# Patient Record
Sex: FEMALE | Race: WHITE | NOT HISPANIC OR LATINO | Employment: FULL TIME | URBAN - METROPOLITAN AREA
[De-identification: names, ages, dates, MRNs, and addresses within clinical notes are randomized per-mention and may not be internally consistent; named-entity substitution may affect disease eponyms.]

---

## 2018-01-02 ENCOUNTER — OFFICE VISIT (OUTPATIENT)
Dept: FAMILY MEDICINE | Facility: CLINIC | Age: 58
End: 2018-01-02
Payer: COMMERCIAL

## 2018-01-02 VITALS
OXYGEN SATURATION: 96 % | DIASTOLIC BLOOD PRESSURE: 70 MMHG | HEIGHT: 64 IN | BODY MASS INDEX: 23.6 KG/M2 | HEART RATE: 73 BPM | WEIGHT: 138.2 LBS | RESPIRATION RATE: 18 BRPM | TEMPERATURE: 98.7 F | SYSTOLIC BLOOD PRESSURE: 132 MMHG

## 2018-01-02 DIAGNOSIS — C91.10 CLL (CHRONIC LYMPHOCYTIC LEUKEMIA) (H): ICD-10-CM

## 2018-01-02 DIAGNOSIS — Z12.31 ENCOUNTER FOR SCREENING MAMMOGRAM FOR BREAST CANCER: ICD-10-CM

## 2018-01-02 DIAGNOSIS — J20.9 ACUTE BRONCHITIS, UNSPECIFIED ORGANISM: Primary | ICD-10-CM

## 2018-01-02 PROCEDURE — 99214 OFFICE O/P EST MOD 30 MIN: CPT | Performed by: PHYSICIAN ASSISTANT

## 2018-01-02 RX ORDER — BENZONATATE 200 MG/1
200 CAPSULE ORAL 3 TIMES DAILY PRN
Qty: 21 CAPSULE | Refills: 0 | Status: SHIPPED | OUTPATIENT
Start: 2018-01-02 | End: 2018-01-12

## 2018-01-02 RX ORDER — AZITHROMYCIN 250 MG/1
TABLET, FILM COATED ORAL
Qty: 6 TABLET | Refills: 0 | Status: SHIPPED | OUTPATIENT
Start: 2018-01-02 | End: 2018-01-12

## 2018-01-02 NOTE — NURSING NOTE
"Chief Complaint   Patient presents with     URI       Initial /70 (BP Location: Right arm, Patient Position: Chair, Cuff Size: Adult Regular)  Pulse 73  Temp 98.7  F (37.1  C) (Tympanic)  Resp 18  Ht 5' 3.5\" (1.613 m)  Wt 138 lb 3.2 oz (62.7 kg)  SpO2 96%  BMI 24.1 kg/m2 Estimated body mass index is 24.1 kg/(m^2) as calculated from the following:    Height as of this encounter: 5' 3.5\" (1.613 m).    Weight as of this encounter: 138 lb 3.2 oz (62.7 kg).  Medication Reconciliation: complete   Jacki Puente MA       "

## 2018-01-02 NOTE — PROGRESS NOTES
"  SUBJECTIVE:   Mckenna Rios is a 57 year old female who presents to clinic today for the following health issues:    RESPIRATORY SYMPTOMS      Duration: Since [place]     Description  nasal congestion and cough    Severity: moderate    Accompanying signs and symptoms: None    History (predisposing factors):  none    Precipitating or alleviating factors: None    Therapies tried and outcome:  Mucinex, Delsym cough syrup, somewhat effective.     -Patient presents to discuss upper respiratory illness since xmas ever  -She has continued to fight her symptoms but they are worsening/not improving  -symptoms first began with cough, sinus drainage  -she continues to have congestion/rhinorrhea but the cough is worse  -throat irritation  -taking delsym for sleep, moderately effective  -denies fevers, chills or body ache    Problem list and histories reviewed & adjusted, as indicated.  Additional history: as documented    Patient Active Problem List   Diagnosis     CLL (chronic lymphocytic leukemia) (H)     Past Surgical History:   Procedure Laterality Date     HYSTERECTOMY  2005       Social History   Substance Use Topics     Smoking status: Never Smoker     Smokeless tobacco: Never Used     Alcohol use No     Family History   Problem Relation Age of Onset     DIABETES Father      Other Cancer Father              Reviewed and updated as needed this visit by clinical staff     Reviewed and updated as needed this visit by Provider         ROS:  Constitutional, HEENT, cardiovascular, pulmonary, gi and gu systems are negative, except as otherwise noted.      OBJECTIVE:   /70 (BP Location: Right arm, Patient Position: Chair, Cuff Size: Adult Regular)  Pulse 73  Temp 98.7  F (37.1  C) (Tympanic)  Resp 18  Ht 5' 3.5\" (1.613 m)  Wt 138 lb 3.2 oz (62.7 kg)  SpO2 96%  BMI 24.1 kg/m2  Body mass index is 24.1 kg/(m^2).  GENERAL: healthy, alert and no distress  EYES: Eyes grossly normal to inspection, PERRL and " conjunctivae and sclerae normal  HENT: normal cephalic/atraumatic, ear canals and TM's normal, nasal mucosa edematous , rhinorrhea white, oropharynx raw and sinuses: not tender  NECK: no adenopathy  RESP: lungs grossly clear to auscultation - some scattered rhonchi clearing with cough  CV: regular rates and rhythm, normal S1 S2, no S3 or S4 and no murmur, click or rub    Diagnostic Test Results:  none     ASSESSMENT/PLAN:   1. Acute bronchitis, unspecified organism  Given hx and duration of symptoms will treat preventively, especially with hx of cll. Continue with supportive cares. Follow up if not improving  - azithromycin (ZITHROMAX) 250 MG tablet; Two tablets first day, then one tablet daily for four days.  Dispense: 6 tablet; Refill: 0  - benzonatate (TESSALON) 200 MG capsule; Take 1 capsule (200 mg) by mouth 3 times daily as needed for cough  Dispense: 21 capsule; Refill: 0    2. Encounter for screening mammogram for breast cancer  due  - *MA Screening Digital Bilateral; Future    3. CLL (chronic lymphocytic leukemia) (H)  Continues with Dr. Arroyo. Follow up next in 02/2018      Alexis Zheng PA-C  [facility]

## 2018-01-02 NOTE — MR AVS SNAPSHOT
After Visit Summary   1/2/2018    Mckenna Rios    MRN: [id number]           Patient Information     Date Of Birth          1960        Visit Information        Provider Department      1/2/2018 2:00 PM Alexis Zheng PA-C [facility]        Today's Diagnoses     Acute bronchitis, unspecified organism    -  1    Encounter for screening mammogram for breast cancer        CLL (chronic lymphocytic leukemia) (H)          Care Instructions    To schedule your mammogram at any of the [place] locations, call [phone].            Follow-ups after your visit        Your next 10 appointments already scheduled     Jan 12, 2018  4:40 PM CST   Pre-Op physical with Alexis Zheng PA-C   [facility]    [address] [address] [phone]              Future tests that were ordered for you today     Open Future Orders        Priority Expected Expires Ordered    *MA Screening Digital Bilateral Routine  1/2/2019 1/2/2018            Who to contact     If you have questions or need follow up information about today's clinic visit or your schedule please contact [facility] directly at [phone].  Normal or non-critical lab and imaging results will be communicated to you by MyChart, letter or phone within 4 business days after the clinic has received the results. If you do not hear from us within 7 days, please contact the clinic through MyChart or phone. If you have a critical or abnormal lab result, we will notify you by phone as soon as possible.  Submit refill requests through ipDatatel or call your pharmacy and they will forward the refill request to us. Please allow 3 business days for your refill to be completed.          Additional Information About Your Visit        MyChart Information     ipDatatel lets you send messages to your doctor, view your test results, renew your prescriptions,  "schedule appointments and more. To sign up, go to [email or web address] . Click on \"Log in\" on the left side of the screen, which will take you to the Welcome page. Then click on \"Sign up Now\" on the right side of the page.     You will be asked to enter the access code listed below, as well as some personal information. Please follow the directions to create your username and password.     Your access code is: [email or web address]  Expires: 2018  2:30 PM     Your access code will  in 90 days. If you need help or a new code, please call your [place] clinic or [phone].        Care EveryWhere ID     This is your Care EveryWhere ID. This could be used by other organizations to access your [place] medical records  [id number]        Your Vitals Were     Pulse Temperature Respirations Height Pulse Oximetry BMI (Body Mass Index)    73 98.7  F (37.1  C) (Tympanic) 18 5' 3.5\" (1.613 m) 96% 24.1 kg/m2       Blood Pressure from Last 3 Encounters:   18 132/70   16 116/78    Weight from Last 3 Encounters:   18 138 lb 3.2 oz (62.7 kg)   16 132 lb 4.8 oz (60 kg)                 Today's Medication Changes          These changes are accurate as of: 18  2:30 PM.  If you have any questions, ask your nurse or doctor.               Start taking these medicines.        Dose/Directions    azithromycin 250 MG tablet   Commonly known as:  ZITHROMAX   Used for:  Acute bronchitis, unspecified organism   Started by:  Alexis Zheng PA-C        Two tablets first day, then one tablet daily for four days.   Quantity:  6 tablet   Refills:  0       benzonatate 200 MG capsule   Commonly known as:  TESSALON   Used for:  Acute bronchitis, unspecified organism   Started by:  Alexis Zheng PA-C        Dose:  200 mg   Take 1 capsule (200 mg) by mouth 3 times daily as needed for cough   Quantity:  21 capsule   Refills:  0            Where to get your medicines      These medications were sent to " [facility]/pharmacy #[id number] - [place], [place] - [address] [address]  [address], [facility] [address]     Phone:  [phone]     azithromycin 250 MG tablet    benzonatate 200 MG capsule                Primary Care Provider Office Phone # Fax #    Alexis Zheng PA-C [phone] [address]  [facility] [address]        Equal Access to Services     DEEPTHI PEARL : Hadii aad ku hadasho Soomaali, waaxda luqadaha, qaybta kaalmada adeegyada, waxay idiin hayaan adeeg kharash la'aan ah. So [facility] [phone].    ATENCIÓN: Si habla español, tiene a kirby disposición servicios gratuitos de asistencia lingüística. Llame al [phone].    We comply with applicable federal civil rights laws and [place] laws. We do not discriminate on the basis of race, color, national origin, age, disability, sex, sexual orientation, or gender identity.            Thank you!     Thank you for choosing [facility]  for your care. Our goal is always to provide you with excellent care. Hearing back from our patients is one way we can continue to improve our services. Please take a few minutes to complete the written survey that you may receive in the mail after your visit with us. Thank you!             Your Updated Medication List - Protect others around you: Learn how to safely use, store and throw away your medicines at www.disposemymeds.org.          This list is accurate as of: 1/2/18  2:30 PM.  Always use your most recent med list.                   Brand Name Dispense Instructions for use Diagnosis    azithromycin 250 MG tablet    ZITHROMAX    6 tablet    Two tablets first day, then one tablet daily for four days.    Acute bronchitis, unspecified organism       benzonatate 200 MG capsule    TESSALON    21 capsule    Take 1 capsule (200 mg) by mouth 3 times daily as needed for cough    Acute bronchitis, unspecified organism

## 2018-01-08 ENCOUNTER — HOSPITAL ENCOUNTER (OUTPATIENT)
Facility: CLINIC | Age: 58
End: 2018-01-08
Attending: OPHTHALMOLOGY | Admitting: OPHTHALMOLOGY
Payer: COMMERCIAL

## 2018-01-12 ENCOUNTER — OFFICE VISIT (OUTPATIENT)
Dept: FAMILY MEDICINE | Facility: CLINIC | Age: 58
End: 2018-01-12
Payer: COMMERCIAL

## 2018-01-12 VITALS
SYSTOLIC BLOOD PRESSURE: 150 MMHG | OXYGEN SATURATION: 98 % | RESPIRATION RATE: 18 BRPM | BODY MASS INDEX: 23.49 KG/M2 | TEMPERATURE: 97.1 F | HEART RATE: 75 BPM | HEIGHT: 64 IN | WEIGHT: 137.6 LBS | DIASTOLIC BLOOD PRESSURE: 70 MMHG

## 2018-01-12 DIAGNOSIS — H26.9 CATARACT OF BOTH EYES, UNSPECIFIED CATARACT TYPE: ICD-10-CM

## 2018-01-12 DIAGNOSIS — Z01.818 PREOP GENERAL PHYSICAL EXAM: Primary | ICD-10-CM

## 2018-01-12 PROCEDURE — 99214 OFFICE O/P EST MOD 30 MIN: CPT | Performed by: PHYSICIAN ASSISTANT

## 2018-01-12 NOTE — PROGRESS NOTES
[facility]  [address] [address] [phone]  Dept: [phone]    PRE-OP EVALUATION:  Today's date: 2018    Mckenna Rios (: 1960) presents for pre-operative evaluation assessment as requested by Dr. Sonu Allen.  She requires evaluation and anesthesia risk assessment prior to undergoing surgery/procedure for treatment .  Proposed procedure: Cataract Surgery - Right Eye     Date of Surgery/ Procedure: 18 - Cataract Surgery - Right Eye   Time of Surgery/ Procedure: 9:00am  Hospital/Surgical Facility: [facility]  Fax number for surgical facility: [phone]  Primary Physician: Alexis Zheng  Type of Anesthesia Anticipated: to be determined    Patient has a Health Care Directive or Living Will:  NO    Preop Questions 2018   1.  Do you have a history of heart attack, stroke, stent, bypass or surgery on an artery in the head, neck, heart or legs? No   2.  Do you ever have any pain or discomfort in your chest? No   3.  Do you have a history of  Heart Failure? No   4.   Are you troubled by shortness of breath when:  walking on a level surface, or up a slight hill, or at night? No   5.  Do you currently have a cold, bronchitis or other respiratory infection? No   6.  Do you have a cough, shortness of breath, or wheezing? YES - mild cough, episodic   7.  Do you sometimes get pains in the calves of your legs when you walk? No   8. Do you or anyone in your family have previous history of blood clots? YES - both her mother and daughter   9.  Do you or does anyone in your family have a serious bleeding problem such as prolonged bleeding following surgeries or cuts? No   10. Have you ever had problems with anemia or been told to take iron pills? No   11. Have you had any abnormal blood loss such as black, tarry or bloody stools, or abnormal vaginal bleeding? No   12. Have you ever had a blood transfusion? No   13. Have you or any  "of your relatives ever had problems with anesthesia? No   14. Do you have sleep apnea, excessive snoring or daytime drowsiness? No   15. Do you have any prosthetic heart valves? No   16. Do you have prosthetic joints? No   17. Is there any chance that you may be pregnant? No           HPI:                                                      Brief HPI related to upcoming procedure: Patient with a 3 year hx lens clouding, vision changes and retinal changes. She was monitoring the eyes for a time, but the right eye is now \"ready for surgery\"      See problem list for active medical problems.  Problems all longstanding and stable, except as noted/documented.  See ROS for pertinent symptoms related to these conditions.                                                                                                  .    MEDICAL HISTORY:                                                    Patient Active Problem List    Diagnosis Date Noted     CLL (chronic lymphocytic leukemia) (H) 11/29/2016     Priority: Medium     See Dr. Arroyo at [facility]. Dx 2011. Labs q 6 months - will be updating this 02/2018, 07/2018        No past medical history on file.  Past Surgical History:   Procedure Laterality Date     HYSTERECTOMY  2005     Current Outpatient Prescriptions   Medication Sig Dispense Refill     azithromycin (ZITHROMAX) 250 MG tablet Two tablets first day, then one tablet daily for four days. 6 tablet 0     benzonatate (TESSALON) 200 MG capsule Take 1 capsule (200 mg) by mouth 3 times daily as needed for cough 21 capsule 0     OTC products: None, except as noted above    No Known Allergies   Latex Allergy: NO    Social History   Substance Use Topics     Smoking status: Never Smoker     Smokeless tobacco: Never Used     Alcohol use No     History   Drug Use No       REVIEW OF SYSTEMS:                                                    C: NEGATIVE for fever, chills, change in weight  EYES: POSITIVE for vision changes, " "b/l  E/M: NEGATIVE for ear, mouth and throat problems  RESP:POSITIVE for mild cough  CV: NEGATIVE for chest pain, palpitations or peripheral edema  ROS otherwise negative    EXAM:                                                    /70  Pulse 75  Temp 97.1  F (36.2  C) (Tympanic)  Resp 18  Ht 5' 3.5\" (1.613 m)  Wt 137 lb 9.6 oz (62.4 kg)  SpO2 98%  BMI 23.99 kg/m2  GENERAL APPEARANCE: healthy, alert and no distress  HENT: ear canals and TM's normal and nose and mouth without ulcers or lesions  RESP: lungs clear to auscultation - no rales, rhonchi or wheezes  CV: regular rates and rhythm, normal S1 S2, no S3 or S4 and no murmur, click or rub  MS: no peripheral edema  NEURO: Normal strength and tone, sensory exam grossly normal, mentation intact and speech normal    DIAGNOSTICS:                                                    No labs or EKG required for low risk surgery (cataract, skin procedure, breast biopsy, etc)    Recent Labs   Lab Test 01/28/16   POTASSIUM  4.2   CR  0.60        IMPRESSION:                                                    Reason for surgery/procedure: cataract repair  Diagnosis/reason for consult: pre-op exam    The proposed surgical procedure is considered LOW risk.    REVISED CARDIAC RISK INDEX  The patient has the following serious cardiovascular risks for perioperative complications such as (MI, PE, VFib and 3  AV Block):  No serious cardiac risks  INTERPRETATION: 0 risks: Class I (very low risk - 0.4% complication rate)    The patient has the following additional risks for perioperative complications:  No identified additional risks      ICD-10-CM    1. Preop general physical exam Z01.818    2. Cataract of both eyes, unspecified cataract type H26.9        RECOMMENDATIONS:                                                      Mckenna's BP was slightly elevated today. We'll need to watch this down the road but this shouldn't preclude her from her procedure. There is certainly no " history of elevation.     APPROVAL GIVEN to proceed with proposed procedure, without further diagnostic evaluation       Signed Electronically by: Alexis Zheng PA-C    Copy of this evaluation report is provided to requesting physician.    [place] Preop Guidelines

## 2018-01-12 NOTE — NURSING NOTE
"Chief Complaint   Patient presents with     Pre-Op Exam       Initial /62 (BP Location: Right arm, Patient Position: Chair, Cuff Size: Adult Regular)  Pulse 75  Temp 97.1  F (36.2  C) (Tympanic)  Resp 18  Ht 5' 3.5\" (1.613 m)  Wt 137 lb 9.6 oz (62.4 kg)  SpO2 98%  BMI 23.99 kg/m2 Estimated body mass index is 23.99 kg/(m^2) as calculated from the following:    Height as of this encounter: 5' 3.5\" (1.613 m).    Weight as of this encounter: 137 lb 9.6 oz (62.4 kg).  Medication Reconciliation: complete   Jacki Puente MA       "

## 2018-01-12 NOTE — MR AVS SNAPSHOT
After Visit Summary   1/12/2018    Mckenna Rios    MRN: [id number]           Patient Information     Date Of Birth          1960        Visit Information        Provider Department      1/12/2018 4:40 PM Alexis Zheng PA-C [facility]        Today's Diagnoses     Preop general physical exam    -  1    Cataract of both eyes, unspecified cataract type          Care Instructions      Before Your Surgery      Call your surgeon if there is any change in your health. This includes signs of a cold or flu (such as a sore throat, runny nose, cough, rash or fever).    Do not smoke, drink alcohol or take over the counter medicine (unless your surgeon or primary care doctor tells you to) for the 24 hours before and after surgery.    If you take prescribed drugs: Follow your doctor s orders about which medicines to take and which to stop until after surgery.    Eating and drinking prior to surgery: follow the instructions from your surgeon    Take a shower or bath the night before surgery. Use the soap your surgeon gave you to gently clean your skin. If you do not have soap from your surgeon, use your regular soap. Do not shave or scrub the surgery site.  Wear clean pajamas and have clean sheets on your bed.           Follow-ups after your visit        Your next 10 appointments already scheduled     Feb 13, 2018   Procedure with Sonu Allen MD   [facility] (--)    [address]  [facility] [id number] [phone]            Feb 13, 2018   Procedure with Sonu Allen MD   [facility] (--)    [address]  [facility] [address]   [phone]              Who to contact     If you have questions or need follow up information about today's clinic visit or your schedule please contact [facility] directly at [phone].  Normal or non-critical lab and imaging results will be  "communicated to you by [employer], letter or phone within 4 business days after the clinic has received the results. If you do not hear from us within 7 days, please contact the clinic through [employer] or phone. If you have a critical or abnormal lab result, we will notify you by phone as soon as possible.  Submit refill requests through [employer] or call your pharmacy and they will forward the refill request to us. Please allow 3 business days for your refill to be completed.          Additional Information About Your Visit        Carwow Information     Car[employer]ow lets you send messages to your doctor, view your test results, renew your prescriptions, schedule appointments and more. To sign up, go to [email or web address] . Click on \"Log in\" on the left side of the screen, which will take you to the Welcome page. Then click on \"Sign up Now\" on the right side of the page.     You will be asked to enter the access code listed below, as well as some personal information. Please follow the directions to create your username and password.     Your access code is: [email or web address]  Expires: 2018  2:30 PM     Your access code will  in 90 days. If you need help or a new code, please call your [place] clinic or [phone].        Care EveryWhere ID     This is your Care EveryWhere ID. This could be used by other organizations to access your [place] medical records  [id number]        Your Vitals Were     Pulse Temperature Respirations Height Pulse Oximetry BMI (Body Mass Index)    75 97.1  F (36.2  C) (Tympanic) 18 5' 3.5\" (1.613 m) 98% 23.99 kg/m2       Blood Pressure from Last 3 Encounters:   18 160/62   18 132/70   16 116/78    Weight from Last 3 Encounters:   18 137 lb 9.6 oz (62.4 kg)   18 138 lb 3.2 oz (62.7 kg)   16 132 lb 4.8 oz (60 kg)              Today, you had the following     No orders found for display       Primary Care Provider Office Phone # Fax #    Alexis Mcnair " RUPERT Zheng [phone]       [address] [facility] [address]        Equal Access to Services     ISHA PEARL : Hadii aad ku hadkeishashannon Solisa, wacecilleda katinaaleha, kathrinta kaelyseda jesús, fadumo rockwelldiane garcia. So [facility] [phone].    ATENCIÓN: Si habla español, tiene a kirby disposición servicios gratuitos de asistencia lingüística. Llame al [phone].    We comply with applicable federal civil rights laws and [place] laws. We do not discriminate on the basis of race, color, national origin, age, disability, sex, sexual orientation, or gender identity.            Thank you!     Thank you for choosing [facility]  for your care. Our goal is always to provide you with excellent care. Hearing back from our patients is one way we can continue to improve our services. Please take a few minutes to complete the written survey that you may receive in the mail after your visit with us. Thank you!             Your Updated Medication List - Protect others around you: Learn how to safely use, store and throw away your medicines at www.disposemymeds.org.      Notice  As of 1/12/2018  5:00 PM    You have not been prescribed any medications.

## 2018-01-24 ENCOUNTER — TELEPHONE (OUTPATIENT)
Dept: FAMILY MEDICINE | Facility: CLINIC | Age: 58
End: 2018-01-24

## 2018-01-24 NOTE — LETTER
[facility]  [address] [address]  Phone: [phone]    February 1, 2018        Mckenna Rios  [address]  [facility] [address]          To whom it may concern:    RE: Mckenna Mcintoshrenetta    I saw this patient for a pre-op 1/12/18.  She has further surgeries upcoming 2/13/18 at [place] with Dr. Sonu Allen.  She is approved for surgery on this date.     Please contact me for questions or concerns.      Sincerely,        Alexis Zheng PA-C

## 2018-01-24 NOTE — TELEPHONE ENCOUNTER
Patient is calling to ask for an addendum to her pre-op.  Her next cataract surgery is going to be on 2/13 at a different location.  The fax number to send it to is [phone]. (ZUNILDA kelly)  (she had talked about this with you at her last appointment)    Please call her if any questions. She will call back in a couple of days to  Be sure this was done.     Alexa Bailey, RN  Triage Nurse

## 2018-01-25 NOTE — TELEPHONE ENCOUNTER
Patient called back, and she will contact the surgery center and ask if this  Would be needed, she will let us know.   Alexa Bailey, ANICETO  Triage Nurse

## 2018-01-25 NOTE — TELEPHONE ENCOUNTER
Please have her call the surgery center and see if they think this is truly necessary. If will be just one month and a day from her pre-op and because done at a  hospital we wont need to fax. If so, I will try to addend this one.

## 2018-02-01 NOTE — TELEPHONE ENCOUNTER
Patient calling back stating that they need provider to clear patient for surgery. They need us to fax over a clearance to [phone] ATTN: Annette. Needs date of surgery, which is 2/13/18,  day provider is signing off, and that patient is cleared for surgery.   Jacki Puente MA

## 2018-02-09 RX ORDER — PHENYLEPHRINE HYDROCHLORIDE 25 MG/ML
1 SOLUTION/ DROPS OPHTHALMIC
Status: CANCELLED | OUTPATIENT
Start: 2018-02-09

## 2018-02-09 RX ORDER — MOXIFLOXACIN 5 MG/ML
1 SOLUTION/ DROPS OPHTHALMIC
Status: CANCELLED | OUTPATIENT
Start: 2018-02-09

## 2018-02-09 RX ORDER — DICLOFENAC SODIUM 1 MG/ML
1 SOLUTION/ DROPS OPHTHALMIC
Status: CANCELLED | OUTPATIENT
Start: 2018-02-09

## 2018-02-09 RX ORDER — TROPICAMIDE 10 MG/ML
1 SOLUTION/ DROPS OPHTHALMIC
Status: CANCELLED | OUTPATIENT
Start: 2018-02-09

## 2018-02-09 RX ORDER — PROPARACAINE HYDROCHLORIDE 5 MG/ML
1 SOLUTION/ DROPS OPHTHALMIC ONCE
Status: CANCELLED | OUTPATIENT
Start: 2018-02-09 | End: 2018-02-09

## 2018-02-13 ENCOUNTER — HOSPITAL ENCOUNTER (OUTPATIENT)
Facility: CLINIC | Age: 58
Discharge: HOME | End: 2018-02-13
Attending: OPHTHALMOLOGY | Admitting: OPHTHALMOLOGY
Payer: COMMERCIAL

## 2018-02-13 ENCOUNTER — TRANSFERRED RECORDS (OUTPATIENT)
Dept: HEALTH INFORMATION MANAGEMENT | Facility: CLINIC | Age: 58
End: 2018-02-13

## 2018-02-13 ENCOUNTER — SURGERY (OUTPATIENT)
Age: 58
End: 2018-02-13

## 2018-02-13 ENCOUNTER — ANESTHESIA (OUTPATIENT)
Dept: SURGERY | Facility: CLINIC | Age: 58
End: 2018-02-13
Payer: COMMERCIAL

## 2018-02-13 ENCOUNTER — ANESTHESIA EVENT (OUTPATIENT)
Dept: SURGERY | Facility: CLINIC | Age: 58
End: 2018-02-13
Payer: COMMERCIAL

## 2018-02-13 VITALS
DIASTOLIC BLOOD PRESSURE: 61 MMHG | HEIGHT: 64 IN | OXYGEN SATURATION: 97 % | BODY MASS INDEX: 23.49 KG/M2 | TEMPERATURE: 98 F | SYSTOLIC BLOOD PRESSURE: 118 MMHG | HEART RATE: 64 BPM | WEIGHT: 137.57 LBS | RESPIRATION RATE: 16 BRPM

## 2018-02-13 PROCEDURE — 25000128 H RX IP 250 OP 636: Performed by: ANESTHESIOLOGY

## 2018-02-13 PROCEDURE — 36000101 ZZH EYE SURGERY LEVEL 3 1ST 30 MIN: Performed by: OPHTHALMOLOGY

## 2018-02-13 PROCEDURE — 25000125 ZZHC RX 250: Performed by: ANESTHESIOLOGY

## 2018-02-13 PROCEDURE — V2632 POST CHMBR INTRAOCULAR LENS: HCPCS | Performed by: OPHTHALMOLOGY

## 2018-02-13 PROCEDURE — 71000028 ZZH EYE RECOVERY PHASE 2 EACH 15 MINS: Performed by: OPHTHALMOLOGY

## 2018-02-13 PROCEDURE — 25000125 ZZHC RX 250: Performed by: OPHTHALMOLOGY

## 2018-02-13 PROCEDURE — V2788 PRESBYOPIA-CORRECT FUNCTION: HCPCS | Performed by: OPHTHALMOLOGY

## 2018-02-13 PROCEDURE — 37000008 ZZH ANESTHESIA TECHNICAL FEE, 1ST 30 MIN: Performed by: OPHTHALMOLOGY

## 2018-02-13 PROCEDURE — 25000128 H RX IP 250 OP 636: Performed by: NURSE ANESTHETIST, CERTIFIED REGISTERED

## 2018-02-13 PROCEDURE — 36000135 ZZH KERATOTOMY ARCUATE W FEMTOSECOND LASER/IMAGING FOR ATIOL: Performed by: OPHTHALMOLOGY

## 2018-02-13 PROCEDURE — 25000128 H RX IP 250 OP 636: Performed by: OPHTHALMOLOGY

## 2018-02-13 PROCEDURE — 40000170 ZZH STATISTIC PRE-PROCEDURE ASSESSMENT II: Performed by: OPHTHALMOLOGY

## 2018-02-13 PROCEDURE — 27210794 ZZH OR GENERAL SUPPLY STERILE: Performed by: OPHTHALMOLOGY

## 2018-02-13 DEVICE — IMPLANTABLE DEVICE: Type: IMPLANTABLE DEVICE | Site: EYE | Status: FUNCTIONAL

## 2018-02-13 RX ORDER — SODIUM CHLORIDE, SODIUM LACTATE, POTASSIUM CHLORIDE, CALCIUM CHLORIDE 600; 310; 30; 20 MG/100ML; MG/100ML; MG/100ML; MG/100ML
INJECTION, SOLUTION INTRAVENOUS CONTINUOUS
Status: DISCONTINUED | OUTPATIENT
Start: 2018-02-13 | End: 2018-02-13 | Stop reason: HOSPADM

## 2018-02-13 RX ORDER — PROPARACAINE HYDROCHLORIDE 5 MG/ML
1 SOLUTION/ DROPS OPHTHALMIC ONCE
Status: DISCONTINUED | OUTPATIENT
Start: 2018-02-13 | End: 2018-02-13 | Stop reason: HOSPADM

## 2018-02-13 RX ORDER — PROPARACAINE HYDROCHLORIDE 5 MG/ML
SOLUTION/ DROPS OPHTHALMIC PRN
Status: DISCONTINUED | OUTPATIENT
Start: 2018-02-13 | End: 2018-02-13 | Stop reason: HOSPADM

## 2018-02-13 RX ORDER — BALANCED SALT SOLUTION 6.4; .75; .48; .3; 3.9; 1.7 MG/ML; MG/ML; MG/ML; MG/ML; MG/ML; MG/ML
SOLUTION OPHTHALMIC PRN
Status: DISCONTINUED | OUTPATIENT
Start: 2018-02-13 | End: 2018-02-13 | Stop reason: HOSPADM

## 2018-02-13 RX ORDER — ACETAMINOPHEN 160 MG
TABLET,DISINTEGRATING ORAL DAILY
COMMUNITY
End: 2018-09-12 | Stop reason: ALTCHOICE

## 2018-02-13 RX ORDER — TROPICAMIDE 10 MG/ML
1 SOLUTION/ DROPS OPHTHALMIC
Status: DISCONTINUED | OUTPATIENT
Start: 2018-02-13 | End: 2018-02-13 | Stop reason: HOSPADM

## 2018-02-13 RX ORDER — PHENYLEPHRINE HYDROCHLORIDE 25 MG/ML
1 SOLUTION/ DROPS OPHTHALMIC
Status: DISCONTINUED | OUTPATIENT
Start: 2018-02-13 | End: 2018-02-13 | Stop reason: HOSPADM

## 2018-02-13 RX ORDER — PROPARACAINE HYDROCHLORIDE 5 MG/ML
1 SOLUTION/ DROPS OPHTHALMIC ONCE
Status: COMPLETED | OUTPATIENT
Start: 2018-02-13 | End: 2018-02-13

## 2018-02-13 RX ORDER — MOXIFLOXACIN 5 MG/ML
1 SOLUTION/ DROPS OPHTHALMIC
Status: DISCONTINUED | OUTPATIENT
Start: 2018-02-13 | End: 2018-02-13 | Stop reason: HOSPADM

## 2018-02-13 RX ORDER — DICLOFENAC SODIUM 1 MG/ML
1 SOLUTION/ DROPS OPHTHALMIC
Status: DISCONTINUED | OUTPATIENT
Start: 2018-02-13 | End: 2018-02-13 | Stop reason: HOSPADM

## 2018-02-13 RX ORDER — LIDOCAINE HYDROCHLORIDE 10 MG/ML
INJECTION, SOLUTION EPIDURAL; INFILTRATION; INTRACAUDAL; PERINEURAL PRN
Status: DISCONTINUED | OUTPATIENT
Start: 2018-02-13 | End: 2018-02-13 | Stop reason: HOSPADM

## 2018-02-13 RX ORDER — ONDANSETRON 2 MG/ML
INJECTION INTRAMUSCULAR; INTRAVENOUS PRN
Status: DISCONTINUED | OUTPATIENT
Start: 2018-02-13 | End: 2018-02-13

## 2018-02-13 RX ADMIN — DICLOFENAC SODIUM 1 DROP: 1 SOLUTION/ DROPS OPHTHALMIC at 11:49

## 2018-02-13 RX ADMIN — Medication 1 APPLICATOR: at 13:12

## 2018-02-13 RX ADMIN — SODIUM CHLORIDE, POTASSIUM CHLORIDE, SODIUM LACTATE AND CALCIUM CHLORIDE: 600; 310; 30; 20 INJECTION, SOLUTION INTRAVENOUS at 12:43

## 2018-02-13 RX ADMIN — MOXIFLOXACIN 1 DROP: 5 SOLUTION/ DROPS OPHTHALMIC at 12:02

## 2018-02-13 RX ADMIN — DICLOFENAC SODIUM 1 DROP: 1 SOLUTION/ DROPS OPHTHALMIC at 12:03

## 2018-02-13 RX ADMIN — PROPARACAINE HYDROCHLORIDE 1 DROP: 5 SOLUTION/ DROPS OPHTHALMIC at 12:27

## 2018-02-13 RX ADMIN — PROPARACAINE HYDROCHLORIDE 1 DROP: 5 SOLUTION/ DROPS OPHTHALMIC at 12:32

## 2018-02-13 RX ADMIN — LIDOCAINE HYDROCHLORIDE 0.5 ML: 35 GEL OPHTHALMIC at 13:05

## 2018-02-13 RX ADMIN — ONDANSETRON 4 MG: 2 INJECTION INTRAMUSCULAR; INTRAVENOUS at 13:02

## 2018-02-13 RX ADMIN — TROPICAMIDE 1 DROP: 10 SOLUTION/ DROPS OPHTHALMIC at 12:02

## 2018-02-13 RX ADMIN — LIDOCAINE HYDROCHLORIDE 1 ML: 10 INJECTION, SOLUTION EPIDURAL; INFILTRATION; INTRACAUDAL; PERINEURAL at 13:11

## 2018-02-13 RX ADMIN — PROPARACAINE HYDROCHLORIDE 1 DROP: 5 SOLUTION/ DROPS OPHTHALMIC at 11:49

## 2018-02-13 RX ADMIN — TROPICAMIDE 1 DROP: 10 SOLUTION/ DROPS OPHTHALMIC at 11:49

## 2018-02-13 RX ADMIN — BALANCED SALT SOLUTION 15 ML: 6.4; .75; .48; .3; 3.9; 1.7 SOLUTION OPHTHALMIC at 13:05

## 2018-02-13 RX ADMIN — MOXIFLOXACIN 1 DROP: 5 SOLUTION/ DROPS OPHTHALMIC at 11:49

## 2018-02-13 RX ADMIN — LIDOCAINE HYDROCHLORIDE 0.5 ML: 10 INJECTION, SOLUTION EPIDURAL; INFILTRATION; INTRACAUDAL; PERINEURAL at 12:04

## 2018-02-13 RX ADMIN — BALANCED SALT SOLUTION 15 ML: 6.4; .75; .48; .3; 3.9; 1.7 SOLUTION OPHTHALMIC at 12:50

## 2018-02-13 RX ADMIN — MIDAZOLAM 2 MG: 1 INJECTION INTRAMUSCULAR; INTRAVENOUS at 12:58

## 2018-02-13 RX ADMIN — PHENYLEPHRINE HYDROCHLORIDE 1 DROP: 2.5 SOLUTION/ DROPS OPHTHALMIC at 12:02

## 2018-02-13 RX ADMIN — EPINEPHRINE 500 ML: 1 INJECTION, SOLUTION, CONCENTRATE INTRAVENOUS at 13:06

## 2018-02-13 RX ADMIN — PHENYLEPHRINE HYDROCHLORIDE 1 DROP: 2.5 SOLUTION/ DROPS OPHTHALMIC at 11:49

## 2018-02-13 NOTE — IP AVS SNAPSHOT
MRN:[id number]                      After Visit Summary   2/13/2018    Mckenna Rios    MRN: [id number]           Thank you!     Thank you for choosing [facility] for your care. Our goal is always to provide you with excellent care. Hearing back from our patients is one way we can continue to improve our services. Please take a few minutes to complete the written survey that you may receive in the mail after you visit with us. Thank you!        Patient Information     Date Of Birth          1960        About your hospital stay     You were admitted on:  February 13, 2018 You last received care in the:  [facility]    You were discharged on:  February 13, 2018       Who to Call     For medical emergencies, please call 911.  For non-urgent questions about your medical care, please call your primary care provider or clinic, [phone]  For questions related to your surgery, please call your surgery clinic        Attending Provider     Provider Specialty    Sonu Allen MD Ophthalmology       Primary Care Provider Office Phone # Fax #    Alexis Zheng PA-C [phone]      Further instructions from your care team       [facility] Discharge  Instructions  Anesthesia ([facility])   Adult Discharge Instructions    For 24 hours after surgery    1. Get plenty of rest.  Make arrangements to have a responsible adult stay with you for at least 6 hours after you leave the hospital.  2. Do not drive or use heavy equipment for 24 hours.    3. Do not drink alcohol for 24 hours.  4. Do not sign legal documents or make important decisions for 24 hours.  5. Avoid strenuous or risky activities. You may feel lightheaded.  If so, sit for a few minutes before standing.  Have someone help you get up.   6. Conscious sedation patients may resume a regular diet..  7. Any questions of medical nature, call your  "physician.    [facility]  Cataract Surgery Discharge Instructions  [facility] MD MARIANO Grier MD J. Hasan, MD C. Nichols, MD J. O'Neill, MD S. Schaefer, MD J. Stephens, MD        You may have been prescribed SmartDrops or OneDrop, which is a compound formula drop that combines all three medications in a single drop. This drop should be instilled to the surgical eye 3 times daily until gone.        Place shield over surgical eye at bedtime for 3 nights.      The eye will feel itchy, scratchy, and vision will be blurred, you may take Tylenol for the scratchy feeling if this is bothersome.      No eye rubbing or swimming for I week.      You may resume all prescription medications as directed by your primary doctor.      Call if increasing pain, progressively worsening vision or worsening redness of surgical eye.      On-call doctor can be reached at [phone].    Pending Results     No orders found from 2/11/2018 to 2/14/2018.            Admission Information     Date & Time Provider Department Dept. Phone    2/13/2018 Sonu Allen MD [facility] [place] [phone]      Your Vitals Were     Blood Pressure Pulse Temperature Respirations Height Weight    118/61 64 98  F (36.7  C) (Temporal) 16 1.613 m (5' 3.5\") 62.4 kg (137 lb 9.1 oz)    Pulse Oximetry BMI (Body Mass Index)                97% 23.99 kg/m2          [email or web address] Information     Envoy lets you send messages to your doctor, view your test results, renew your prescriptions, schedule appointments and more. To sign up, go to [email or web address] . Click on \"Log in\" on the left side of the screen, which will take you to the Welcome page. Then click on \"Sign up Now\" on the right side of the page.     You will be asked to enter the access code listed below, as well as some personal information. Please follow the directions to create your " username and password.     Your access code is: [email or web address]  Expires: 2018  2:30 PM     Your access code will  in 90 days. If you need help or a new code, please call your [place] clinic or [phone].        Care EveryWhere ID     This is your Care EveryWhere ID. This could be used by other organizations to access your [place] medical records  [id number]        Equal Access to Services     DEEPTHI [facility] : Hadii edgar ku hadasho Soomaali, waaxda luqadaha, qaybta kaalmada adeegyada, waxay millerin haychristophen lynnette ulloa . So [facility] [phone].    ATENCIÓN: Si habla español, tiene a kirby disposición servicios gratuitos de asistencia lingüística. Llame al [phone].    We comply with applicable federal civil rights laws and [place] laws. We do not discriminate on the basis of race, color, national origin, age, disability, sex, sexual orientation, or gender identity.               Review of your medicines      CONTINUE these medicines which have NOT CHANGED        Dose / Directions    FISH OIL CONCENTRATE PO        Take by mouth daily   Refills:  0       Gummi Bear Multivitamin  /Min Chew        Take by mouth daily   Refills:  0                Protect others around you: Learn how to safely use, store and throw away your medicines at www.disposemymeds.org.             Medication List: This is a list of all your medications and when to take them. Check marks below indicate your daily home schedule. Keep this list as a reference.      Medications           Morning Afternoon Evening Bedtime As Needed    FISH OIL CONCENTRATE PO   Take by mouth daily                                Gummi Bear Multivitamin  /Min Chew   Take by mouth daily

## 2018-02-13 NOTE — ANESTHESIA POSTPROCEDURE EVALUATION
Patient: Mckenna Rios    Procedure(s):  LEFT EYE FEMTOSECOND LASER ASSISTED PHACOEMULSIFICATION CLEAR CORNEA WITH DELUXE MULTIFOCAL INTRAOCULAR LENS IMPLANT - Wound Class: I-Clean    Diagnosis:CATARACT  Diagnosis Additional Information: No value filed.    Anesthesia Type:  MAC    Note:  Anesthesia Post Evaluation    Patient location during evaluation: PACU  Patient participation: Able to fully participate in evaluation  Level of consciousness: awake  Pain management: adequate  Airway patency: patent  Cardiovascular status: acceptable  Respiratory status: acceptable  Hydration status: acceptable  PONV: none     Anesthetic complications: None          Last vitals:  Vitals:    02/13/18 1205 02/13/18 1319 02/13/18 1329   BP:  113/64 118/61   Pulse: 64     Resp: 18 16 16   Temp: 36.7  C (98  F)     SpO2: 98% 95% 97%         Electronically Signed By: Palmer Bello MD  February 13, 2018  2:09 PM

## 2018-02-13 NOTE — ANESTHESIA PREPROCEDURE EVALUATION
"Procedure: Procedure(s):  PHACOEMULSIFICATION CLEAR CORNEA WITH DELUXE INTRAOCULAR LENS IMPLANT  Preop diagnosis: CATARACT  No Known Allergies  Patient Active Problem List   Diagnosis     CLL (chronic lymphocytic leukemia) (H)     Past Medical History:   Diagnosis Date     CLL (chronic lymphocytic leukemia) (H)      Past Surgical History:   Procedure Laterality Date     GYN SURGERY       HYSTERECTOMY  2005       No current facility-administered medications on file prior to encounter.   No current outpatient prescriptions on file prior to encounter.  Pulse 64  Temp 36.7  C (98  F) (Temporal)  Resp 18  Ht 1.613 m (5' 3.5\")  Wt 62.4 kg (137 lb 9.1 oz)  SpO2 98%  BMI 23.99 kg/m2    No results found for: WBC  No results found for: RBC  No results found for: HGB  No results found for: HCT  No results found for: MCV  No results found for: MCH  No results found for: MCHC  No results found for: RDW  No results found for: PLT  No results found for: INR    Last Basic Metabolic Panel:  No results found for: NA   Lab Results   Component Value Date    POTASSIUM 4.2 01/28/2016     No results found for: CHLORIDE  No results found for: LISA  No results found for: CO2  No results found for: BUN  Lab Results   Component Value Date    CR 0.60 01/28/2016     Lab Results   Component Value Date    GLC 91 01/28/2016     Anesthesia Evaluation     . Pt has had prior anesthetic.     No history of anesthetic complications          ROS/MED HX    ENT/Pulmonary: Comment: Chronic cough     (-) sleep apnea and recent URI   Neurologic:  - neg neurologic ROS    (-) CVA and migraines   Cardiovascular:  - neg cardiovascular ROS       METS/Exercise Tolerance:     Hematologic:     (+) Other Hematologic Disorder-CLL      Musculoskeletal:         GI/Hepatic:  - neg GI/hepatic ROS      (-) GERD   Renal/Genitourinary:  - ROS Renal section negative       Endo:  - neg endo ROS    (-) Type II DM and thyroid disease   Psychiatric:  - neg psychiatric ROS     "   Infectious Disease:  - neg infectious disease ROS       Malignancy:   (+) Malignancy History of Lymphoma/Leukemia          Other:                     Physical Exam  Normal systems: cardiovascular, pulmonary and dental    Airway   Mallampati: I  TM distance: >3 FB  Neck ROM: full    Dental     Cardiovascular   Rhythm and rate: regular and normal      Pulmonary    breath sounds clear to auscultation                    Anesthesia Plan      History & Physical Review  History and physical reviewed and following examination; no interval change.    ASA Status:  2 .    NPO Status:  > 8 hours    Plan for MAC Reason for MAC:  Procedure to face, neck, head or breast  PONV prophylaxis:  Ondansetron (or other 5HT-3)       Postoperative Care  Postoperative pain management:  IV analgesics.      Consents  Anesthetic plan, risks, benefits and alternatives discussed with:  Patient..                          .

## 2018-02-13 NOTE — OP NOTE
PREOPERATIVE DIAGNOSIS: Cataract, Left eye.   POSTOPERATIVE DIAGNOSIS: Cataract, Left eye.   OPERATION: Femto assisted Phacoemulsification with implantation of posterior chamber intraocular lens, Left eye.   ANESTHESIA: Monitored anesthesia care.   INDICATIONS FOR SURGERY: Mckenna Rios has noted a progressive decline in the vision of her Left eye secondary to a cataract. This has affected her ability to perform routine functions including reading. The patient has progressive cataract changes consistent with her vision and symptoms.     PROCEDURE: Informed consent was obtained from the patient preoperatively with the risks and alternatives reviewed, including the possibility of loss of vision. In the preoperative area, the patient was administered topical anesthetic consisting of 2% Xylocaine jelly. The patient was taken to the operating room. The face was prepped and draped in the usual sterile fashion. Attention was directed to the Left eye. A stab incision was made at the limbus with a 15-degree blade. Viscoat was used to replace aqueous. A keratome was used to make a limbal self-sealing incision 2.5 mm in diameter. A curvilinear capsulorrhexis was performed with the Utrata forceps. Hydrodissection was carried out. The nucleus was removed with the phacoemulsification handpiece in a four-quadrant cracking technique. The cortex was removed with the irrigation and aspiration handpiece. The posterior capsule remained intact. Provisc was used to inflate the capsular bag. A posterior chamber intraocular lens was taken from its case and inspected. It was free of defects and it was folded into the shooter. The lens was then injected into the eye by directing the leading haptic into the capsular bag. The trailing haptic was then placed in the eye with a haptic . The lens centered well. The Provisc was removed from the eye with the I&A handpiece. The eye was inflated with balanced salt solution. The wound was  inspected and found to be watertight. Topical Vigamox and Pred Forte were applied. An eye shield was placed over the eye. The patient tolerated the procedure well and left the operating area in good condition.       Implant Name Type Inv. Item Serial No.  Lot No. LRB No. Used   EYE IMP IOL JENAE PCL TECNIS SYMFONY XR [id number] 10.5 Lens/Eye Implant EYE IMP IOL JENAE PCL TECNIS SYMFONY XR ZXR00 10.5 [id number] [facility] OPTIC   Left 1       Sonu Allen M.D.

## 2018-02-13 NOTE — ANESTHESIA CARE TRANSFER NOTE
Patient: Mckenna Rios    Procedure(s):  LEFT EYE FEMTOSECOND LASER ASSISTED PHACOEMULSIFICATION CLEAR CORNEA WITH DELUXE MULTIFOCAL INTRAOCULAR LENS IMPLANT - Wound Class: I-Clean    Diagnosis: CATARACT  Diagnosis Additional Information: No value filed.    Anesthesia Type:   MAC     Note:  Airway :Room Air  Patient transferred to:Phase II  Comments: Transferred to [facility] recovery room in recliner with armrests up, spontaneous respirations, O2 saturation maintained greater than 95% with oxygen via room air. All monitors and alarms on and functioning, clinically stable vital signs. Report given to recovery RN and questions answered. Patient alert and following verbal directions.Handoff Report: Identifed the Patient, Identified the Reponsible Provider, Reviewed the pertinent medical history, Discussed the surgical course, Reviewed Intra-OP anesthesia mangement and issues during anesthesia, Set expectations for post-procedure period and Allowed opportunity for questions and acknowledgement of understanding      Vitals: (Last set prior to Anesthesia Care Transfer)    CRNA VITALS  2/13/2018 1246 - 2/13/2018 1320      2/13/2018             Pulse: 65    Ht Rate: 64    SpO2: 100 %    Resp Rate (set): 10    EKG: Sinus rhythm                Electronically Signed By: OMA Vergara CRNA  February 13, 2018  1:20 PM

## 2018-02-13 NOTE — DISCHARGE INSTRUCTIONS
[facility] Discharge  Instructions  Anesthesia ([facility])   Adult Discharge Instructions    For 24 hours after surgery    1. Get plenty of rest.  Make arrangements to have a responsible adult stay with you for at least 6 hours after you leave the hospital.  2. Do not drive or use heavy equipment for 24 hours.    3. Do not drink alcohol for 24 hours.  4. Do not sign legal documents or make important decisions for 24 hours.  5. Avoid strenuous or risky activities. You may feel lightheaded.  If so, sit for a few minutes before standing.  Have someone help you get up.   6. Conscious sedation patients may resume a regular diet..  7. Any questions of medical nature, call your physician.    [facility]  Cataract Surgery Discharge Instructions  [facility] MD MARIANO Grier MD J. Hasan, MD C. Nichols, MD J. O'Neill, MD S. Schaefer, MD J. Stephens, MD        You may have been prescribed SmartDrops or OneDrop, which is a compound formula drop that combines all three medications in a single drop. This drop should be instilled to the surgical eye 3 times daily until gone.        Place shield over surgical eye at bedtime for 3 nights.      The eye will feel itchy, scratchy, and vision will be blurred, you may take Tylenol for the scratchy feeling if this is bothersome.      No eye rubbing or swimming for I week.      You may resume all prescription medications as directed by your primary doctor.      Call if increasing pain, progressively worsening vision or worsening redness of surgical eye.      On-call doctor can be reached at [phone].

## 2018-02-13 NOTE — IP AVS SNAPSHOT
[facility]    [address]    [place] [address]    Phone:  [phone]    Fax:  [phone]                                       After Visit Summary   2/13/2018    Mckenna Rios    MRN: [id number]           After Visit Summary Signature Page     I have received my discharge instructions, and my questions have been answered. I have discussed any challenges I see with this plan with the nurse or doctor.    ..........................................................................................................................................  Patient/Patient Representative Signature      ..........................................................................................................................................  Patient Representative Print Name and Relationship to Patient    ..................................................               ................................................  Date                                            Time    ..........................................................................................................................................  Reviewed by Signature/Title    ...................................................              ..............................................  Date                                                            Time

## 2018-03-06 ENCOUNTER — TELEPHONE (OUTPATIENT)
Dept: FAMILY MEDICINE | Facility: CLINIC | Age: 58
End: 2018-03-06

## 2018-03-06 NOTE — TELEPHONE ENCOUNTER
Panel Management Review      Patient has the following on her problem list: None      Composite cancer screening  Chart review shows that this patient is due/due soon for the following Mammogram  Summary:    Patient is due/failing the following:   MAMMOGRAM    Action needed:   Patient needs referral/order: Mammogram     Type of outreach:    Spoke with patient at [facility] on 1/12/18, patient will schedule.     Questions for provider review:    None                                                                                                                                    Jacki Puente MA        Chart Closed.

## 2018-04-19 ENCOUNTER — HOSPITAL ENCOUNTER (OUTPATIENT)
Dept: MAMMOGRAPHY | Facility: CLINIC | Age: 58
Discharge: HOME | End: 2018-04-19
Attending: PHYSICIAN ASSISTANT | Admitting: PHYSICIAN ASSISTANT
Payer: COMMERCIAL

## 2018-04-19 DIAGNOSIS — Z12.31 ENCOUNTER FOR SCREENING MAMMOGRAM FOR BREAST CANCER: ICD-10-CM

## 2018-04-19 PROCEDURE — 77067 SCR MAMMO BI INCL CAD: CPT

## 2018-09-12 ENCOUNTER — OFFICE VISIT (OUTPATIENT)
Dept: INTERNAL MEDICINE | Facility: CLINIC | Age: 58
End: 2018-09-12
Payer: COMMERCIAL

## 2018-09-12 VITALS
RESPIRATION RATE: 12 BRPM | HEIGHT: 64 IN | DIASTOLIC BLOOD PRESSURE: 60 MMHG | SYSTOLIC BLOOD PRESSURE: 116 MMHG | TEMPERATURE: 98.1 F | OXYGEN SATURATION: 95 % | WEIGHT: 134 LBS | BODY MASS INDEX: 22.88 KG/M2 | HEART RATE: 72 BPM

## 2018-09-12 DIAGNOSIS — C91.10 CLL (CHRONIC LYMPHOCYTIC LEUKEMIA) (H): ICD-10-CM

## 2018-09-12 DIAGNOSIS — Z11.59 NEED FOR HEPATITIS C SCREENING TEST: ICD-10-CM

## 2018-09-12 DIAGNOSIS — Z11.51 SPECIAL SCREENING EXAMINATION FOR HUMAN PAPILLOMAVIRUS (HPV): Primary | ICD-10-CM

## 2018-09-12 DIAGNOSIS — Z00.00 LABORATORY EXAMINATION ORDERED AS PART OF A ROUTINE GENERAL MEDICAL EXAMINATION: ICD-10-CM

## 2018-09-12 LAB
BASOPHILS # BLD AUTO: 0.2 10E9/L (ref 0–0.2)
BASOPHILS NFR BLD AUTO: 1 %
DIFFERENTIAL METHOD BLD: ABNORMAL
ERYTHROCYTE [DISTWIDTH] IN BLOOD BY AUTOMATED COUNT: 13.1 % (ref 10–15)
HCT VFR BLD AUTO: 41.9 % (ref 35–47)
HGB BLD-MCNC: 13.3 G/DL (ref 11.7–15.7)
LYMPHOCYTES # BLD AUTO: 17.7 10E9/L (ref 0.8–5.3)
LYMPHOCYTES NFR BLD AUTO: 80 %
MACROCYTES BLD QL SMEAR: PRESENT
MCH RBC QN AUTO: 32 PG (ref 26.5–33)
MCHC RBC AUTO-ENTMCNC: 31.7 G/DL (ref 31.5–36.5)
MCV RBC AUTO: 101 FL (ref 78–100)
MONOCYTES # BLD AUTO: 0.2 10E9/L (ref 0–1.3)
MONOCYTES NFR BLD AUTO: 1 %
NEUTROPHILS # BLD AUTO: 4 10E9/L (ref 1.6–8.3)
NEUTROPHILS NFR BLD AUTO: 18 %
PLATELET # BLD AUTO: 221 10E9/L (ref 150–450)
RBC # BLD AUTO: 4.16 10E12/L (ref 3.8–5.2)
RBC MORPH BLD: ABNORMAL
WBC # BLD AUTO: 22.1 10E9/L (ref 4–11)

## 2018-09-12 PROCEDURE — 86803 HEPATITIS C AB TEST: CPT | Performed by: NURSE PRACTITIONER

## 2018-09-12 PROCEDURE — G0145 SCR C/V CYTO,THINLAYER,RESCR: HCPCS | Performed by: NURSE PRACTITIONER

## 2018-09-12 PROCEDURE — 85025 COMPLETE CBC W/AUTO DIFF WBC: CPT | Performed by: NURSE PRACTITIONER

## 2018-09-12 PROCEDURE — 80061 LIPID PANEL: CPT | Performed by: NURSE PRACTITIONER

## 2018-09-12 PROCEDURE — 99214 OFFICE O/P EST MOD 30 MIN: CPT | Performed by: NURSE PRACTITIONER

## 2018-09-12 PROCEDURE — 87624 HPV HI-RISK TYP POOLED RSLT: CPT | Performed by: NURSE PRACTITIONER

## 2018-09-12 PROCEDURE — 36415 COLL VENOUS BLD VENIPUNCTURE: CPT | Performed by: NURSE PRACTITIONER

## 2018-09-12 PROCEDURE — 80053 COMPREHEN METABOLIC PANEL: CPT | Performed by: NURSE PRACTITIONER

## 2018-09-12 PROCEDURE — 84443 ASSAY THYROID STIM HORMONE: CPT | Performed by: NURSE PRACTITIONER

## 2018-09-12 RX ORDER — MULTIPLE VITAMINS W/ MINERALS TAB 9MG-400MCG
1 TAB ORAL DAILY
Qty: 100 TABLET | Refills: 3 | COMMUNITY
Start: 2018-09-12

## 2018-09-12 NOTE — MR AVS SNAPSHOT
"              After Visit Summary   9/12/2018    Mckenna Rios    MRN: [id number]           Patient Information     Date Of Birth          1960        Visit Information        Provider Department      9/12/2018 8:40 AM Traci Shepherd APRN CNP [facility]        Today's Diagnoses     Special screening examination for human papillomavirus (HPV)    -  1    CLL (chronic lymphocytic leukemia) (H)        Laboratory examination ordered as part of a routine general medical examination        Need for hepatitis C screening test          Care Instructions    Lab in [address]          Follow-ups after your visit        Who to contact     If you have questions or need follow up information about today's clinic visit or your schedule please contact [facility] directly at [phone].  Normal or non-critical lab and imaging results will be communicated to you by real trendshart, letter or phone within 4 business days after the clinic has received the results. If you do not hear from us within 7 days, please contact the clinic through real trendshart or phone. If you have a critical or abnormal lab result, we will notify you by phone as soon as possible.  Submit refill requests through Chideo or call your pharmacy and they will forward the refill request to us. Please allow 3 business days for your refill to be completed.          Additional Information About Your Visit        real trendshart Information     Chideo lets you send messages to your doctor, view your test results, renew your prescriptions, schedule appointments and more. To sign up, go to [email or web address] . Click on \"Log in\" on the left side of the screen, which will take you to the Welcome page. Then click on \"Sign up Now\" on the right side of the page.     You will be asked to enter the access code listed below, as well as some personal information. Please follow the directions to create your username and password.     Your " "access code is: [email or web address]  Expires: 2018  8:37 AM     Your access code will  in 90 days. If you need help or a new code, please call your [place] clinic or [phone].        Care EveryWhere ID     This is your Care EveryWhere ID. This could be used by other organizations to access your [place] medical records  [id number]        Your Vitals Were     Pulse Temperature Respirations Height Pulse Oximetry BMI (Body Mass Index)    72 98.1  F (36.7  C) (Oral) 12 5' 3.5\" (1.613 m) 95% 23.36 kg/m2       Blood Pressure from Last 3 Encounters:   18 116/60   18 118/61   18 150/70    Weight from Last 3 Encounters:   18 134 lb (60.8 kg)   18 137 lb 9.1 oz (62.4 kg)   18 137 lb 9.6 oz (62.4 kg)              We Performed the Following     CBC with platelets differential     Comprehensive metabolic panel     Hepatitis C Screen Reflex to HCV RNA Quant and Genotype     Lipid panel reflex to direct LDL Fasting     Pap imaged thin layer screen with HPV - recommended age 30 - 65 years (select HPV order below)     TSH with free T4 reflex          Today's Medication Changes          These changes are accurate as of 18  9:20 AM.  If you have any questions, ask your nurse or doctor.               Stop taking these medicines if you haven't already. Please contact your care team if you have questions.     Gummi Bear Multivitamin  /Min Chew   Stopped by:  Traci Shepherd APRN CNP                    Primary Care Provider Office Phone # Fax #    Alexis Zheng PA-C [phone] [address] [facility] [address]        Equal Access to Services     DEEPTHI PEARL : Kyrie Baker, lakshmi davis, carson kaalmada jesús, fadumo zelaya. So [facility] [phone].    ATENCIÓN: Si habla español, tiene a kirby disposición servicios gratuitos de asistencia lingüística. Llame al [phone].    We comply with applicable " federal civil rights laws and [place] laws. We do not discriminate on the basis of race, color, national origin, age, disability, sex, sexual orientation, or gender identity.            Thank you!     Thank you for choosing [facility]  for your care. Our goal is always to provide you with excellent care. Hearing back from our patients is one way we can continue to improve our services. Please take a few minutes to complete the written survey that you may receive in the mail after your visit with us. Thank you!             Your Updated Medication List - Protect others around you: Learn how to safely use, store and throw away your medicines at www.disposemymeds.org.          This list is accurate as of 9/12/18  9:20 AM.  Always use your most recent med list.                   Brand Name Dispense Instructions for use Diagnosis    FISH OIL CONCENTRATE PO      Take by mouth daily        Multi-vitamin Tabs tablet     100 tablet    Take 1 tablet by mouth daily

## 2018-09-12 NOTE — PROGRESS NOTES
"  SUBJECTIVE:   Mckenna Rios is a 58 year old female who presents to clinic today for the following health issues:    Establish Care  Pap only.     Dr Arroyo follows for CLL  No treatment given ever- just found on a random blood draw   Checking every 6-8 months     She sees Alexis Zheng PAC at [place]       Problem list and histories reviewed & adjusted, as indicated.  Additional history: as documented    Patient Active Problem List   Diagnosis     CLL (chronic lymphocytic leukemia) (H)     Past Surgical History:   Procedure Laterality Date     CATARACT IOL, RT/LT  2018     GYN SURGERY       HYSTERECTOMY  2005    ovaries and tubes removed - fibroids      KERATOTOMY ARCUATE WITH FEMTOSECOND LASER/IMAGING FOR ATIOL Left 2/13/2018    Procedure: KERATOTOMY ARCUATE WITH FEMTOSECOND LASER/IMAGING FOR ATIOL;  LEFT EYE FEMTOSECOND LASER CAPSULOTOMY; LENS FRAGMENTATION; ARCUATE INCISIONS;  Surgeon: Sonu Allen MD;  Location: [facility]     PHACOEMULSIFICATION CLEAR CORNEA WITH DELUXE INTRAOCULAR LENS IMPLANT Left 2/13/2018    Procedure: PHACOEMULSIFICATION CLEAR CORNEA WITH DELUXE INTRAOCULAR LENS IMPLANT;  LEFT EYE FEMTOSECOND LASER ASSISTED PHACOEMULSIFICATION CLEAR CORNEA WITH DELUXE MULTIFOCAL INTRAOCULAR LENS IMPLANT;  Surgeon: Sonu Allen MD;  Location: [facility]       Social History   Substance Use Topics     Smoking status: Never Smoker     Smokeless tobacco: Never Used     Alcohol use No     Family History   Problem Relation Age of Onset     Diabetes Father      Lung Cancer Father            Reviewed and updated as needed this visit by clinical staff  Tobacco  Allergies  Soc Hx      Reviewed and updated as needed this visit by Provider  Allergies         ROS:  Constitutional, HEENT, cardiovascular, pulmonary, gi and gu systems are negative, except as otherwise noted.    OBJECTIVE:     /60  Pulse 72  Temp 98.1  F (36.7  C) (Oral)  Resp 12  Ht 5' 3.5\" (1.613 m)  Wt 134 lb (60.8 kg) "  SpO2 95%  BMI 23.36 kg/m2  Body mass index is 23.36 kg/(m^2).  GENERAL: alert and no distress  RESP: lungs clear to auscultation - no rales, rhonchi or wheezes  CV: regular rate and rhythm, normal S1 S2, no S3 or S4, no murmur, click or rub, no peripheral edema    (female): normal female external genitalia, normal urethral meatus, vaginal mucosa, normal cervix/adnexa/uterus without masses or discharge  MS: no gross musculoskeletal defects noted, no edema  NEURO: Normal strength and tone, mentation intact and speech normal  PSYCH: mentation appears normal, affect normal/bright    Diagnostic Test Results:  PAP   Lab     ASSESSMENT/PLAN:             1. Special screening examination for human papillomavirus (HPV)    - Pap imaged thin layer screen with HPV - recommended age 30 - 65 years (select HPV order below)  - Comprehensive metabolic panel  - TSH with free T4 reflex  - CBC with platelets differential  - Lipid panel reflex to direct LDL Non-fasting    2. CLL (chronic lymphocytic leukemia) (H)    - Comprehensive metabolic panel  - TSH with free T4 reflex  - CBC with platelets differential  - Lipid panel reflex to direct LDL Non-fasting    3. Laboratory examination ordered as part of a routine general medical examination    - Comprehensive metabolic panel  - TSH with free T4 reflex  - CBC with platelets differential  - Lipid panel reflex to direct LDL Non-fasting    4. Need for hepatitis C screening test    - Hepatitis C Screen Reflex to HCV RNA Quant and Genotype    Patient Instructions   Lab in [address]      OMA Kemp [facility]

## 2018-09-13 LAB
ALBUMIN SERPL-MCNC: 4.2 G/DL (ref 3.4–5)
ALP SERPL-CCNC: 80 U/L (ref 40–150)
ALT SERPL W P-5'-P-CCNC: 30 U/L (ref 0–50)
ANION GAP SERPL CALCULATED.3IONS-SCNC: 7 MMOL/L (ref 3–14)
AST SERPL W P-5'-P-CCNC: 22 U/L (ref 0–45)
BILIRUB SERPL-MCNC: 0.5 MG/DL (ref 0.2–1.3)
BUN SERPL-MCNC: 10 MG/DL (ref 7–30)
CALCIUM SERPL-MCNC: 8.9 MG/DL (ref 8.5–10.1)
CHLORIDE SERPL-SCNC: 104 MMOL/L (ref 94–109)
CHOLEST SERPL-MCNC: 212 MG/DL
CO2 SERPL-SCNC: 30 MMOL/L (ref 20–32)
CREAT SERPL-MCNC: 0.54 MG/DL (ref 0.52–1.04)
GFR SERPL CREATININE-BSD FRML MDRD: >90 ML/MIN/1.7M2
GLUCOSE SERPL-MCNC: 93 MG/DL (ref 70–99)
HCV AB SERPL QL IA: NONREACTIVE
HDLC SERPL-MCNC: 81 MG/DL
LDLC SERPL CALC-MCNC: 121 MG/DL
NONHDLC SERPL-MCNC: 131 MG/DL
POTASSIUM SERPL-SCNC: 4 MMOL/L (ref 3.4–5.3)
PROT SERPL-MCNC: 7.6 G/DL (ref 6.8–8.8)
SODIUM SERPL-SCNC: 141 MMOL/L (ref 133–144)
TRIGL SERPL-MCNC: 52 MG/DL
TSH SERPL DL<=0.005 MIU/L-ACNC: 1.15 MU/L (ref 0.4–4)

## 2018-09-14 ENCOUNTER — TRANSFERRED RECORDS (OUTPATIENT)
Dept: HEALTH INFORMATION MANAGEMENT | Facility: CLINIC | Age: 58
End: 2018-09-14

## 2018-09-15 LAB
COPATH REPORT: NORMAL
PAP: NORMAL

## 2018-09-17 LAB
FINAL DIAGNOSIS: NORMAL
HPV HR 12 DNA CVX QL NAA+PROBE: NEGATIVE
HPV16 DNA SPEC QL NAA+PROBE: NEGATIVE
HPV18 DNA SPEC QL NAA+PROBE: NEGATIVE
SPECIMEN DESCRIPTION: NORMAL
SPECIMEN SOURCE CVX/VAG CYTO: NORMAL

## 2018-10-17 ENCOUNTER — TRANSFERRED RECORDS (OUTPATIENT)
Dept: HEALTH INFORMATION MANAGEMENT | Facility: CLINIC | Age: 58
End: 2018-10-17

## 2019-04-30 ENCOUNTER — TRANSFERRED RECORDS (OUTPATIENT)
Dept: HEALTH INFORMATION MANAGEMENT | Facility: CLINIC | Age: 59
End: 2019-04-30

## 2019-04-30 ENCOUNTER — HOSPITAL ENCOUNTER (OUTPATIENT)
Dept: MAMMOGRAPHY | Facility: CLINIC | Age: 59
Discharge: HOME | End: 2019-04-30
Attending: PHYSICIAN ASSISTANT | Admitting: PHYSICIAN ASSISTANT
Payer: COMMERCIAL

## 2019-04-30 DIAGNOSIS — Z12.31 VISIT FOR SCREENING MAMMOGRAM: ICD-10-CM

## 2019-04-30 PROCEDURE — 77067 SCR MAMMO BI INCL CAD: CPT

## 2020-02-21 ENCOUNTER — TRANSFERRED RECORDS (OUTPATIENT)
Dept: HEALTH INFORMATION MANAGEMENT | Facility: CLINIC | Age: 60
End: 2020-02-21

## 2020-03-10 ENCOUNTER — HEALTH MAINTENANCE LETTER (OUTPATIENT)
Age: 60
End: 2020-03-10

## 2020-06-12 ENCOUNTER — OFFICE VISIT (OUTPATIENT)
Dept: OBGYN | Facility: CLINIC | Age: 60
End: 2020-06-12
Payer: COMMERCIAL

## 2020-06-12 VITALS — DIASTOLIC BLOOD PRESSURE: 68 MMHG | SYSTOLIC BLOOD PRESSURE: 130 MMHG | WEIGHT: 137 LBS | BODY MASS INDEX: 23.89 KG/M2

## 2020-06-12 DIAGNOSIS — N89.8 VAGINAL LESION: Primary | ICD-10-CM

## 2020-06-12 PROCEDURE — 99203 OFFICE O/P NEW LOW 30 MIN: CPT | Performed by: ADVANCED PRACTICE MIDWIFE

## 2020-06-12 PROCEDURE — 87070 CULTURE OTHR SPECIMN AEROBIC: CPT | Performed by: ADVANCED PRACTICE MIDWIFE

## 2020-06-12 PROCEDURE — 87186 SC STD MICRODIL/AGAR DIL: CPT | Performed by: ADVANCED PRACTICE MIDWIFE

## 2020-06-12 PROCEDURE — 87077 CULTURE AEROBIC IDENTIFY: CPT | Performed by: ADVANCED PRACTICE MIDWIFE

## 2020-06-12 NOTE — PROGRESS NOTES
SUBJECTIVE:                                                   Mckenna Rios is a 60 year old who presents to clinic today for the following health issue(s):  Patient presents with:  Vaginal Problem: hard swollen painul sore on vagina      HPI:  Pt reports that on Saturday night she was sexually active. On Sunday was taking a bath and noticed a painful swollen area on vagina. It has been getting increasingly swollen over the last week although today reports it feels slightly less swollen. Reports she is sexually active infrequently due to increased vaginal dryness. Denies hx of STDs    No LMP recorded. Patient has had a hysterectomy.  Menstrual History: s/p hysterectomy  Patient is sexually active  No obstetric history on file..  Using hysterectomy for contraception.   Health maintenance updated:  no  STI infx testing offered:  Declined, low risk    Last PHQ-9 score on record = No flowsheet data found.  Last GAD7 score on record = No flowsheet data found.    Problem list and histories reviewed & adjusted, as indicated.  Additional history: as documented.    Patient Active Problem List   Diagnosis     CLL (chronic lymphocytic leukemia) (H)     Past Surgical History:   Procedure Laterality Date     CATARACT IOL, RT/LT  2018     GYN SURGERY       HYSTERECTOMY  2005    ovaries and tubes removed - fibroids      KERATOTOMY ARCUATE WITH FEMTOSECOND LASER/IMAGING FOR ATIOL Left 2/13/2018    Procedure: KERATOTOMY ARCUATE WITH FEMTOSECOND LASER/IMAGING FOR ATIOL;  LEFT EYE FEMTOSECOND LASER CAPSULOTOMY; LENS FRAGMENTATION; ARCUATE INCISIONS;  Surgeon: Sonu Allen MD;  Location: [facility]     PHACOEMULSIFICATION CLEAR CORNEA WITH DELUXE INTRAOCULAR LENS IMPLANT Left 2/13/2018    Procedure: PHACOEMULSIFICATION CLEAR CORNEA WITH DELUXE INTRAOCULAR LENS IMPLANT;  LEFT EYE FEMTOSECOND LASER ASSISTED PHACOEMULSIFICATION CLEAR CORNEA WITH DELUXE MULTIFOCAL INTRAOCULAR LENS IMPLANT;  Surgeon: Sonu Allen,  MD;  Location: [facility]      Social History     Tobacco Use     Smoking status: Never Smoker     Smokeless tobacco: Never Used   Substance Use Topics     Alcohol use: No      Problem (# of Occurrences) Relation (Name,Age of Onset)    Diabetes (1) Father    Lung Cancer (1) Father            Current Outpatient Medications   Medication Sig     multivitamin, therapeutic with minerals (MULTI-VITAMIN) TABS tablet Take 1 tablet by mouth daily     Omega-3 Fatty Acids (FISH OIL CONCENTRATE PO) Take by mouth daily     No current facility-administered medications for this visit.      No Known Allergies    ROS:  CONSTITUTIONAL: NEGATIVE for fever, chills, change in weight  INTEGUMENTARU/SKIN: NEGATIVE for worrisome rashes, moles or lesions, positive for lesion on right labia  RESP: NEGATIVE for significant cough or SOB  BREAST: NEGATIVE for masses, tenderness or discharge  GI: NEGATIVE for nausea, abdominal pain, heartburn, or change in bowel habits  : NEGATIVE for unusual urinary or vaginal symptoms. Positive for swollen area on right labia.  NEURO: NEGATIVE for weakness, dizziness or paresthesias  PSYCHIATRIC: NEGATIVE for changes in mood or affect    OBJECTIVE:     /68 (BP Location: Right arm, Cuff Size: Adult Regular)   Wt 62.1 kg (137 lb)   BMI 23.89 kg/m    Body mass index is 23.89 kg/m .    PHYSICAL EXAM:  Pelvic Exam:  External Genitalia:     Normal appearance for age, no discharge present, no tenderness present, no inflammatory lesions present, color normal  Vagina:     Normal vaginal vault without central or paravaginal defects, no discharge present, no inflammatory lesions present, no masses present  Bladder:     Nontender to palpation  Urethra:   Urethral Body:  Urethra palpation normal, urethra structural support normal   Urethral Meatus:  No erythema or lesions present  Pubic Hair:     Normal pubic hair distribution for age  Genitalia and Groin:     No rashes present, 2 excoriations on right labia, tender  to palpation, mild swelling of right labia majora, no significant redness, exudate or drainage.       In-Clinic Test Results:  No results found for this or any previous visit (from the past 24 hour(s)).    ASSESSMENT/PLAN:                                                        ICD-10-CM    1. Vaginal lesion  N89.8 Wound Culture Aerobic Bacterial GICH (FUTURE)       PLAN:    Small vaginal lesion present, given that lesion developed shortly after intercourse, suspect it is a superficial excoriation. Culture sent although no significant drainage.  Lesion not suspicious of HSV, pt denies hx for her or her partner- sexually active with same partner for many years.   Discussed that this area will likely heal on it's own, recommend keeping area clean and dry and observe for any signs of infection.  RTO if symptoms worsen or do not resolve in the next week.   Recommended f/u with MD if not improved as pt may need biopsy done.    ANGELA Braun CNM 6/12/2020 3:39 PM

## 2020-06-15 ENCOUNTER — TELEPHONE (OUTPATIENT)
Dept: OBGYN | Facility: CLINIC | Age: 60
End: 2020-06-15

## 2020-06-15 DIAGNOSIS — N89.8 VAGINAL LESION: Primary | ICD-10-CM

## 2020-06-15 LAB
BACTERIA SPEC CULT: ABNORMAL
Lab: ABNORMAL
SPECIMEN SOURCE: ABNORMAL

## 2020-06-15 NOTE — TELEPHONE ENCOUNTER
Called and left message for pt to call. I would like to know how she has been feeling over the weekend. Both of the bacteria found on her wound culture can be part of normal vaginal jossie, if she her symptoms are continuing to improve, we can continue to monitor. If her symptoms have worsened or are not improving (increased redness, warmth, swelling) I would like to start her on antibiotics.     ANGELA Braun CNM 6/15/2020 10:42 AM

## 2020-06-16 RX ORDER — GENTAMICIN SULFATE 1 MG/G
OINTMENT TOPICAL 3 TIMES DAILY
Qty: 30 G | Refills: 0 | Status: SHIPPED | OUTPATIENT
Start: 2020-06-16 | End: 2022-04-09

## 2020-06-16 NOTE — TELEPHONE ENCOUNTER
Please advise patient that I have ordered her some topical antibiotics and sent the prescription to the [facility] pharmacy in [place] listed in her chart. If no improvement in symptoms in next 48 hours, advise patient to call and be seen again in clinic.    Thank you,      OMA Tesfaye, CHRIS

## 2020-06-16 NOTE — TELEPHONE ENCOUNTER
Pt calling back.   States that things are about the same. The swelling has gone done a little, but she is still very uncomfortable.     Please advise.     Routed to midwife on-call today.    Denise DAI RN

## 2020-06-18 NOTE — TELEPHONE ENCOUNTER
Pt calling to update on med use.    Vaginal lesion is getting better, swelling has gone down a lot.  She reports much improvement.      Rebekah ADDISON R.N.

## 2020-09-17 ENCOUNTER — HOSPITAL ENCOUNTER (OUTPATIENT)
Dept: MAMMOGRAPHY | Facility: CLINIC | Age: 60
Discharge: HOME | End: 2020-09-17
Attending: PHYSICIAN ASSISTANT | Admitting: PHYSICIAN ASSISTANT
Payer: COMMERCIAL

## 2020-09-17 DIAGNOSIS — Z12.31 ENCOUNTER FOR SCREENING MAMMOGRAM FOR BREAST CANCER: ICD-10-CM

## 2020-09-17 PROCEDURE — 77067 SCR MAMMO BI INCL CAD: CPT

## 2020-12-27 ENCOUNTER — HEALTH MAINTENANCE LETTER (OUTPATIENT)
Age: 60
End: 2020-12-27

## 2021-04-24 ENCOUNTER — HEALTH MAINTENANCE LETTER (OUTPATIENT)
Age: 61
End: 2021-04-24

## 2021-10-09 ENCOUNTER — HEALTH MAINTENANCE LETTER (OUTPATIENT)
Age: 61
End: 2021-10-09

## 2021-10-13 ENCOUNTER — HOSPITAL ENCOUNTER (OUTPATIENT)
Dept: MAMMOGRAPHY | Facility: CLINIC | Age: 61
Discharge: HOME | End: 2021-10-13
Attending: PHYSICIAN ASSISTANT | Admitting: PHYSICIAN ASSISTANT
Payer: COMMERCIAL

## 2021-10-13 DIAGNOSIS — Z12.31 VISIT FOR SCREENING MAMMOGRAM: ICD-10-CM

## 2021-10-13 PROCEDURE — 77067 SCR MAMMO BI INCL CAD: CPT

## 2022-03-17 ENCOUNTER — OFFICE VISIT (OUTPATIENT)
Dept: URGENT CARE | Facility: URGENT CARE | Age: 62
End: 2022-03-17
Payer: COMMERCIAL

## 2022-03-17 ENCOUNTER — ANCILLARY PROCEDURE (OUTPATIENT)
Dept: GENERAL RADIOLOGY | Facility: CLINIC | Age: 62
End: 2022-03-17
Attending: NURSE PRACTITIONER
Payer: COMMERCIAL

## 2022-03-17 VITALS
TEMPERATURE: 98.1 F | WEIGHT: 139 LBS | SYSTOLIC BLOOD PRESSURE: 118 MMHG | DIASTOLIC BLOOD PRESSURE: 70 MMHG | HEART RATE: 78 BPM | BODY MASS INDEX: 24.24 KG/M2

## 2022-03-17 DIAGNOSIS — S99.921A FOOT INJURY, RIGHT, INITIAL ENCOUNTER: Primary | ICD-10-CM

## 2022-03-17 DIAGNOSIS — S92.514A CLOSED NONDISPLACED FRACTURE OF PROXIMAL PHALANX OF LESSER TOE OF RIGHT FOOT, INITIAL ENCOUNTER: ICD-10-CM

## 2022-03-17 DIAGNOSIS — S99.921A FOOT INJURY, RIGHT, INITIAL ENCOUNTER: ICD-10-CM

## 2022-03-17 PROCEDURE — 99204 OFFICE O/P NEW MOD 45 MIN: CPT | Performed by: NURSE PRACTITIONER

## 2022-03-17 PROCEDURE — 73630 X-RAY EXAM OF FOOT: CPT | Mod: RT | Performed by: RADIOLOGY

## 2022-03-17 NOTE — PROGRESS NOTES
Chief Complaint   Patient presents with     Urgent Care     Right Pinky toe injury x 2 weeks     SUBJECTIVE:  Mckenna Rios is a 62 year old female who presents to the clinic today with a right foot injury. She stubbed her toes 2 weeks ago on bathtub, 5th metatarsal and phalanx pain, bruising, cannot bear weight. She is walking on the heel.    Past Medical History:   Diagnosis Date     CLL (chronic lymphocytic leukemia) (H)     sees oncology Dr Arroyo      multivitamin, therapeutic with minerals (MULTI-VITAMIN) TABS tablet, Take 1 tablet by mouth daily  Omega-3 Fatty Acids (FISH OIL CONCENTRATE PO), Take by mouth daily  gentamicin (GARAMYCIN) 0.1 % external ointment, Apply topically 3 times daily (Patient not taking: Reported on 3/17/2022)    No current facility-administered medications on file prior to visit.    Social History     Tobacco Use     Smoking status: Never Smoker     Smokeless tobacco: Never Used   Substance Use Topics     Alcohol use: No     No Known Allergies    Review of Systems   All systems negative except for those listed above in HPI.    EXAM:   /70   Pulse 78   Temp 98.1  F (36.7  C) (Oral)   Wt 63 kg (139 lb)   BMI 24.24 kg/m      Physical Exam  Vitals reviewed.   Constitutional:       General: She is not in acute distress.     Appearance: Normal appearance. She is not ill-appearing, toxic-appearing or diaphoretic.   HENT:      Head: Normocephalic and atraumatic.   Cardiovascular:      Rate and Rhythm: Normal rate.   Pulmonary:      Effort: Pulmonary effort is normal.   Musculoskeletal:         General: Swelling and tenderness present. Normal range of motion.      Comments: 5th metatarsal and phalanx bruising, tenderness   Skin:     General: Skin is warm and dry.      Findings: Bruising and erythema present. No rash.   Neurological:      General: No focal deficit present.      Mental Status: She is alert and oriented to person, place, and time.   Psychiatric:         Mood and  Affect: Mood normal.         Behavior: Behavior normal.       Xray done in clinic read by me as positive for fracture of the 5th proximal phalanx.    ASSESSMENT:    ICD-10-CM    1. Foot injury, right, initial encounter  S99.921A XR Foot Right G/E 3 Views   2. Closed nondisplaced fracture of proximal phalanx of lesser toe of right foot, initial encounter  S92.514A Ankle/Foot Bracing Supplies Order for DME - ONLY FOR DME     PLAN:    Rest, ice, elevate  Rotate tylenol and ibuprofen  Post op shoe and porfirio tape in clinic  Follow-up with ortho if lingering pain, or concerns in 1 month    Patient Instructions     Patient Education     Closed Toe Fracture  Your toe is broken (fractured). This causes pain, swelling, and sometimes bruising. This injury usually takes about 4 to 6 weeks to heal, but can sometimes take longer. Toe injuries are often treated by taping the injured toe to the next one (porfirio taping). Or you may have a hard shoe, splint, or cast. These protect the injured toe and hold it in position.   If the toenail has been severely injured, it may fall off in 1 to 2 weeks. It takes up to 12 months for a new toenail to grow back.   Home care  Follow these guidelines when caring for yourself at home:    You may be given a cast shoe to wear to keep your toe from moving. If not, you can use a sandal or any shoe that doesn t put pressure on the injured toe until the swelling and pain go away. If using a sandal, be careful not to strike your foot against anything. Another injury could make the fracture worse. If you were given crutches, don t put full weight on the injured foot until you can do so without pain, or as directed by your healthcare provider.    Keep your foot elevated to reduce pain and swelling. When sleeping, put a pillow under the injured leg. When sitting, support the injured leg so it is above your waist. This is very important during the first 2 days (48 hours).    Put an ice pack on the injured  area. Do this for 20 minutes every 1 to 2 hours the first day for pain relief. You can make an ice pack by wrapping a plastic bag of ice cubes in a thin towel. As the ice melts, be careful that any cloth or paper tape doesn t get wet. Continue using the ice pack 3 to 4 times a day for the next 2 days. Then use the ice pack as needed to ease pain and swelling.    If buddy tape was used and it becomes wet or dirty, change it. You may replace it with paper, plastic, or cloth tape. Cloth tape and paper tapes must be kept dry.    You may use acetaminophen or ibuprofen to control pain, unless another pain medicine was prescribed. If you have chronic liver or kidney disease, talk with your healthcare provider before using these medicines. Also talk with your provider if you ve had a stomach ulcer or gastrointestinal bleeding.    You may return to sports or physical education activities after 4 weeks when you can run without pain, or as directed by your healthcare provider.  Follow-up care  Follow up with your healthcare provider or as advised. This is to make sure the bone is healing the way it should.   X-rays may be taken. You will be told of any new findings that may affect your care.  When to seek medical advice  Call your healthcare provider right away if any of these occur:    Pain or swelling gets worse    The cast/splint cracks    The cast and padding get wet and stays wet more than 24 hours    Bad odor from the cast/splint or wound fluid stains the cast    Tightness or pressure under the cast/splint gets worse    Toe becomes cold, blue, numb, or tingly    You can t move the toe    Signs of infection: fever, redness, warmth, swelling, or drainage from the wound or cast    Fever of 100.4 F (38 C) or higher, or as directed by your healthcare provider    Shaking chills  [employer] last reviewed this educational content on 2/1/2020 2000-2021 [employer]. All rights reserved. This information is not  intended as a substitute for professional medical care. Always follow your healthcare professional's instructions.             Follow up with primary care provider with any problems, questions or concerns or if symptoms worsen or fail to improve. Patient agreed to plan and verbalized understanding.    MONIQUE [facility] [place]

## 2022-03-17 NOTE — PATIENT INSTRUCTIONS
Patient Education     Closed Toe Fracture  Your toe is broken (fractured). This causes pain, swelling, and sometimes bruising. This injury usually takes about 4 to 6 weeks to heal, but can sometimes take longer. Toe injuries are often treated by taping the injured toe to the next one (buddy taping). Or you may have a hard shoe, splint, or cast. These protect the injured toe and hold it in position.   If the toenail has been severely injured, it may fall off in 1 to 2 weeks. It takes up to 12 months for a new toenail to grow back.   Home care  Follow these guidelines when caring for yourself at home:    You may be given a cast shoe to wear to keep your toe from moving. If not, you can use a sandal or any shoe that doesn t put pressure on the injured toe until the swelling and pain go away. If using a sandal, be careful not to strike your foot against anything. Another injury could make the fracture worse. If you were given crutches, don t put full weight on the injured foot until you can do so without pain, or as directed by your healthcare provider.    Keep your foot elevated to reduce pain and swelling. When sleeping, put a pillow under the injured leg. When sitting, support the injured leg so it is above your waist. This is very important during the first 2 days (48 hours).    Put an ice pack on the injured area. Do this for 20 minutes every 1 to 2 hours the first day for pain relief. You can make an ice pack by wrapping a plastic bag of ice cubes in a thin towel. As the ice melts, be careful that any cloth or paper tape doesn t get wet. Continue using the ice pack 3 to 4 times a day for the next 2 days. Then use the ice pack as needed to ease pain and swelling.    If buddy tape was used and it becomes wet or dirty, change it. You may replace it with paper, plastic, or cloth tape. Cloth tape and paper tapes must be kept dry.    You may use acetaminophen or ibuprofen to control pain, unless another pain medicine  was prescribed. If you have chronic liver or kidney disease, talk with your healthcare provider before using these medicines. Also talk with your provider if you ve had a stomach ulcer or gastrointestinal bleeding.    You may return to sports or physical education activities after 4 weeks when you can run without pain, or as directed by your healthcare provider.  Follow-up care  Follow up with your healthcare provider or as advised. This is to make sure the bone is healing the way it should.   X-rays may be taken. You will be told of any new findings that may affect your care.  When to seek medical advice  Call your healthcare provider right away if any of these occur:    Pain or swelling gets worse    The cast/splint cracks    The cast and padding get wet and stays wet more than 24 hours    Bad odor from the cast/splint or wound fluid stains the cast    Tightness or pressure under the cast/splint gets worse    Toe becomes cold, blue, numb, or tingly    You can t move the toe    Signs of infection: fever, redness, warmth, swelling, or drainage from the wound or cast    Fever of 100.4 F (38 C) or higher, or as directed by your healthcare provider    Gisele Munoz last reviewed this educational content on 2/1/2020 2000-2021 [employer]. All rights reserved. This information is not intended as a substitute for professional medical care. Always follow your healthcare professional's instructions.

## 2022-04-09 ENCOUNTER — TELEPHONE (OUTPATIENT)
Dept: URGENT CARE | Facility: URGENT CARE | Age: 62
End: 2022-04-09

## 2022-04-09 ENCOUNTER — ANCILLARY PROCEDURE (OUTPATIENT)
Dept: GENERAL RADIOLOGY | Facility: CLINIC | Age: 62
End: 2022-04-09
Attending: FAMILY MEDICINE
Payer: COMMERCIAL

## 2022-04-09 ENCOUNTER — OFFICE VISIT (OUTPATIENT)
Dept: URGENT CARE | Facility: URGENT CARE | Age: 62
End: 2022-04-09
Payer: COMMERCIAL

## 2022-04-09 ENCOUNTER — NURSE TRIAGE (OUTPATIENT)
Dept: NURSING | Facility: CLINIC | Age: 62
End: 2022-04-09

## 2022-04-09 VITALS
DIASTOLIC BLOOD PRESSURE: 74 MMHG | SYSTOLIC BLOOD PRESSURE: 152 MMHG | OXYGEN SATURATION: 98 % | HEART RATE: 88 BPM | TEMPERATURE: 98.4 F

## 2022-04-09 DIAGNOSIS — M79.674 PAIN OF TOE OF RIGHT FOOT: ICD-10-CM

## 2022-04-09 DIAGNOSIS — S92.501G: Primary | ICD-10-CM

## 2022-04-09 DIAGNOSIS — L03.031 CELLULITIS OF TOE OF RIGHT FOOT: ICD-10-CM

## 2022-04-09 PROCEDURE — 99213 OFFICE O/P EST LOW 20 MIN: CPT | Performed by: FAMILY MEDICINE

## 2022-04-09 PROCEDURE — 73660 X-RAY EXAM OF TOE(S): CPT | Mod: RT | Performed by: RADIOLOGY

## 2022-04-09 RX ORDER — CEPHALEXIN 500 MG/1
500 CAPSULE ORAL 3 TIMES DAILY
Qty: 21 CAPSULE | Refills: 0 | Status: SHIPPED | OUTPATIENT
Start: 2022-04-09 | End: 2022-04-16

## 2022-04-09 ASSESSMENT — PAIN SCALES - GENERAL: PAINLEVEL: WORST PAIN (10)

## 2022-04-09 NOTE — PATIENT INSTRUCTIONS
follow up with a podiatrist within the next week for further evaluation and treatment.      Continue using the post-op shoe.      Elevate the right foot as much as possible.      Take Ibuprofen, Tylenol for the pain.

## 2022-04-09 NOTE — TELEPHONE ENCOUNTER
Patient was in urgent care today - waiting for prescription for Cephalxin, but pharmacy says they have not yet received it.    Patient's chart says medication was received from pharmacy at 2:51pm today. Called pharmacy to confirm that it was received.    Patient informed.    Traci Loyola RN  04/09/22 3:50 PM  [facility] Nurse Advisor    Reason for Disposition    Pharmacy calling with prescription question and triager answers question    Caller has medication question only, adult not sick, and triager answers question    Protocols used: MEDICATION QUESTION CALL-A-

## 2022-04-09 NOTE — PROGRESS NOTES
SUBJECTIVE:   Mckenna Rios is a 62 year old female who was diagnosed on March 17, 2022 with a fracture at the right fifth proximal phalanx.  Patient is presenting with a chief complaint of redness and persistent throbbing at the right fifth proximal phalanx region.  .  .  Onset of symptoms was the beginning of March 2022.    Course of illness is worsening over the past week (especially since 7 days ago).  .    Treatment measures tried include elevation.  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  She denies of rubbing of the postop shoe against the right fifth toe.      Patient stubbed the toes of the right foot on a bathtub around the beginning of March 2022.  Patient was evaluated at the [facility] on March 17, 2022, and the X-ray showed a fracture at the right fifth proximal phalanx.  Patient was placed in a post-op shoe and buddy splint was started.    .    Past Medical History:   Diagnosis Date     CLL (chronic lymphocytic leukemia) (H)     sees oncology Dr Arroyo      Current Outpatient Medications   Medication Sig Dispense Refill     Acetaminophen (TYLENOL) 325 MG CAPS Take 325-650 mg by mouth every 4 hours as needed       IBUPROFEN PO        multivitamin, therapeutic with minerals (MULTI-VITAMIN) TABS tablet Take 1 tablet by mouth daily 100 tablet 3     Omega-3 Fatty Acids (FISH OIL CONCENTRATE PO) Take by mouth daily       Social History     Tobacco Use     Smoking status: Never Smoker     Smokeless tobacco: Never Used   Substance Use Topics     Alcohol use: No       ROS:  CONSTITUTIONAL:negative for fevers.    MUSCULOSKELETAL: positive for right fifth toe pain and redness.  .      OBJECTIVE:  BP (!) 152/74   Pulse 88   Temp 98.4  F (36.9  C) (Tympanic)   SpO2 98%   GENERAL APPEARANCE: healthy, alert and no distress  Extremities: right fifth toe has purplish-red  discoloration over the fifth proximal phalanx area and mild edema.  There is tenderness over this area.  No increased warmth.  No abrasions.  No cuts/lacerations/pustules/vesicles.      X-rays of the right fifth toe:  I viewed all X-rays during this clinic encounter.  The X-rays showed a persistent nondisplaced fracture at the distal aspect of the fifth proximal phalanx.  .      ASSESSMENT:  Unhealed or slow-healing Fracture at the right fifth proximal phalanx.      Cellulitis    PLAN:  follow up with a podiatrist next week for further evaluation and treatment.      Rx:  Cephalexin to cover for possible cellulitis    Tylenol, ibuprofen for the pain    Elevate the right foot    Continue the post-op shoe      Skyler Ardon MD

## 2022-04-09 NOTE — LETTER
[facility] [place]  [address]  [address]  [place] [address] [phone]      April 9, 2022    RE:  Mckenna Rios                                                                                                                                                       [address], [place] [address]            To whom it may concern:    Mckenna Rios is under my professional care at the [facility] on April 9, 2022.  Because of her current fracture at the proximal phalanx of the right pinky toe, please allow her to elevate her right foot for 10 minutes every hour while at work starting on April 13, 2022.  Further work restrictions will be written by the podiatrist.           Sincerely,        Skyler Ardon MD    [facility]

## 2022-04-10 ENCOUNTER — NURSE TRIAGE (OUTPATIENT)
Dept: NURSING | Facility: CLINIC | Age: 62
End: 2022-04-10
Payer: COMMERCIAL

## 2022-04-10 NOTE — TELEPHONE ENCOUNTER
"SUBJECTIVE:  The radiology report on the April 9, 2022, X-rays of the right fifth toe showed a \"healing nondisplaced fracture\" at the proximal phalanx bone of the right fifth toe (also known as the pinky toe).      PLAN:  Please notify patient of this result.  Patient should follow up with a podiatrist for further evaluation and treatment, since it has been six weeks since the injury occurred, and since a fracture is still seen on today's X-rays.      Skyler Ardon MD    "

## 2022-04-10 NOTE — TELEPHONE ENCOUNTER
Patient calling back regarding x-ray results and follow-up plan.    Read the following message from Dr. Ardon to patient:        Patient will plan to follow-up with podiatry per providers recommendation.    Traci Loyola RN  04/10/22 9:20 AM  [facility] Nurse Advisor    Reason for Disposition    [1] Follow-up call to recent contact AND [2] information only call, no triage required    Protocols used: INFORMATION ONLY CALL - NO TRIAGE-A-

## 2022-04-10 NOTE — TELEPHONE ENCOUNTER
Called and lm for patient to call back regarding x-ray results/follow up plan.    Matthew Hammond MA [place]  8:45 AM 4/10/2022

## 2022-04-12 ENCOUNTER — OFFICE VISIT (OUTPATIENT)
Dept: PODIATRY | Facility: CLINIC | Age: 62
End: 2022-04-12
Attending: FAMILY MEDICINE
Payer: COMMERCIAL

## 2022-04-12 VITALS
BODY MASS INDEX: 23.73 KG/M2 | WEIGHT: 139 LBS | DIASTOLIC BLOOD PRESSURE: 72 MMHG | SYSTOLIC BLOOD PRESSURE: 150 MMHG | HEIGHT: 64 IN

## 2022-04-12 DIAGNOSIS — M79.674 PAIN OF TOE OF RIGHT FOOT: ICD-10-CM

## 2022-04-12 DIAGNOSIS — S92.501G: ICD-10-CM

## 2022-04-12 PROCEDURE — 99243 OFF/OP CNSLTJ NEW/EST LOW 30: CPT | Performed by: PODIATRIST

## 2022-04-12 NOTE — LETTER
4/12/2022         RE: Mckenna Rios  [address]  [place] [address]        Dear Colleague,    Thank you for referring your patient, Mckenna Rios, to the [facility]OD[facility]. Please see a copy of my visit note below.    PATIENT HISTORY:  Dr. Ardon requested I see this patient for their foot issue.  Mckenna Rios is a 62 year old female who presents to clinic for right fifth toe fracture.  About 6 weeks ago she stubbed her toe on the bathtub.  She had x-rays taken at that time.  Followed up 3 weeks later for another repeat x-ray.  It was still red and throbbing at that point.  She was started on an antibiotic.  She also notes that she was off work for the last 2 weeks and thinks that that helped a lot to with the pain and redness.  Wondering if she needs to continue to take the antibiotics and what else can be done for the foot.    Review of Systems:  Patient denies fever, chills, rash, wound, stiffness, numbness, weakness, heart burn, blood in stool, chest pain with activity, calf pain when walking, shortness of breath with activity, chronic cough, easy bleeding/bruising, swelling of ankles, excessive thirst, fatigue, depression, anxiety.  Patient admits to limping at times  .     PAST MEDICAL HISTORY:   Past Medical History:   Diagnosis Date     CLL (chronic lymphocytic leukemia) (H)     sees oncology Dr Arroyo         PAST SURGICAL HISTORY:   Past Surgical History:   Procedure Laterality Date     CATARACT IOL, RT/LT  2018     GYN SURGERY       HYSTERECTOMY  2005    ovaries and tubes removed - fibroids      KERATOTOMY ARCUATE WITH FEMTOSECOND LASER/IMAGING FOR ATIOL Left 2/13/2018    Procedure: KERATOTOMY ARCUATE WITH FEMTOSECOND LASER/IMAGING FOR ATIOL;  LEFT EYE FEMTOSECOND LASER CAPSULOTOMY; LENS FRAGMENTATION; ARCUATE INCISIONS;  Surgeon: Sonu Allen MD;  Location:  [facility]     PHACOEMULSIFICATION CLEAR CORNEA WITH DELUXE INTRAOCULAR LENS IMPLANT Left  "2/13/2018    Procedure: PHACOEMULSIFICATION CLEAR CORNEA WITH DELUXE INTRAOCULAR LENS IMPLANT;  LEFT EYE FEMTOSECOND LASER ASSISTED PHACOEMULSIFICATION CLEAR CORNEA WITH DELUXE MULTIFOCAL INTRAOCULAR LENS IMPLANT;  Surgeon: Sonu Allen MD;  Location: [facility]        MEDICATIONS:   Current Outpatient Medications:      Acetaminophen (TYLENOL) 325 MG CAPS, Take 325-650 mg by mouth every 4 hours as needed, Disp: , Rfl:      cephALEXin (KEFLEX) 500 MG capsule, Take 1 capsule (500 mg) by mouth 3 times daily for 7 days, Disp: 21 capsule, Rfl: 0     IBUPROFEN PO, , Disp: , Rfl:      multivitamin, therapeutic with minerals (MULTI-VITAMIN) TABS tablet, Take 1 tablet by mouth daily, Disp: 100 tablet, Rfl: 3     Omega-3 Fatty Acids (FISH OIL CONCENTRATE PO), Take by mouth daily, Disp: , Rfl:      ALLERGIES:  No Known Allergies     SOCIAL HISTORY:   Social History     Socioeconomic History     Marital status:      Spouse name: Not on file     Number of children: Not on file     Years of education: Not on file     Highest education level: Not on file   Occupational History     Not on file   Tobacco Use     Smoking status: Never Smoker     Smokeless tobacco: Never Used   Substance and Sexual Activity     Alcohol use: No     Drug use: No     Sexual activity: Yes     Partners: Male   Other Topics Concern     Parent/sibling w/ CABG, MI or angioplasty before 65F 55M? Not Asked   Social History Narrative     Not on file     Social Determinants of Health     Financial Resource Strain: Not on file   Food Insecurity: Not on file   Transportation Needs: Not on file   Physical Activity: Not on file   Stress: Not on file   Social Connections: Not on file   Intimate Partner Violence: Not on file   Housing Stability: Not on file        FAMILY HISTORY:   Family History   Problem Relation Age of Onset     Diabetes Father      Lung Cancer Father         EXAM:Vitals: BP (!) 150/72   Ht 1.613 m (5' 3.5\")   Wt 63 kg (139 lb)   BMI " 24.24 kg/m      General appearance: Patient is alert and fully cooperative with history & exam.  No sign of distress is noted during the visit.     Psychiatric: Affect is pleasant & appropriate.  Patient appears motivated to improve health.     Respiratory: Breathing is regular & unlabored while sitting.     HEENT: Hearing is intact to spoken word.  Speech is clear.  No gross evidence of visual impairment that would impact ambulation.     Dermatologic: Minimal ecchymosis noted to the right fifth toe.  No tenting of the skin is noted.  No open lesions or signs of acute infection is noted.     Vascular: DP & PT pulses are intact & regular bilaterally.  No significant edema or varicosities noted.  CFT and skin temperature is normal to both lower extremities.     Neurologic: Lower extremity sensation is intact to light touch.  No evidence of weakness or contracture in the lower extremities.  No evidence of neuropathy.     Musculoskeletal: Patient is ambulatory without assistive device or brace.  Pain on palpation of the right fifth toe.    Radiographs: Right foot x-ray - I personally reviewed the xrays - Healing nondisplaced fracture of the distal portion of the proximal phalanx of the fifth toe with slight interval healing since the prior study. No evidence of additional fractures.     ASSESSMENT:    Pain of toe of right foot  Closed fracture of phalanx of right fifth toe with delayed healing     Medical Decision Making/Plan:  Reviewed patient's chart in [facility].  Reviewed and discussed x-rays. Talked about fractures. Discussed that healing can take 6-10 weeks. Risk that the fracture will not heal and we may need to do surgery. Risk is increased 10-15% if you smoke.     Discussed that her fracture is in the joint and I feel that because she is on her feet all day at work in the postop shoe that there was just too much movement at the fracture site and that is what caused it to be red.  She does not need to continue taking  the antibiotics.  We will switch her from the postop shoe to a short Aircast boot to try to help immobilize the area more while she is at work.  She was also given a letter for work to elevate the foot frequently and sit down frequently.  We will have her follow-up in 1 month for reassessment and rah-ray.  All questions were answered to patient satisfaction she will call for the questions or concerns.    Patient risk factor: Patient is at low risk for infection.        Angelina Eldridge DPM, Podiatry/Foot and Ankle Surgery        Again, thank you for allowing me to participate in the care of your patient.        Sincerely,        Angelina Eldridge DPM, Podiatry/Foot and Ankle Surgery

## 2022-04-12 NOTE — LETTER
REPORT OF WORK ABILITY    [facility]  [address]  [address]  [place] [address]  [phone]    Employee Name: Mckenna Rios          Today's date: 4/12/2022    To whom it may concern:    Patient was seen in clinic today for her right foot.  At this time she will need to be in a short Aircast boot at all times when walking.  Please allow her to elevate her foot for 10 minutes every hour and to do more seated work if possible.  These restrictions will continue for the next 6 weeks.  We will reassess at that time.  Please accommodate.  Please call with questions or concerns.            Angelina Eldridge DPM

## 2022-04-12 NOTE — PROGRESS NOTES
PATIENT HISTORY:  Dr. Ardon requested I see this patient for their foot issue.  Mckenna Rios is a 62 year old female who presents to clinic for right fifth toe fracture.  About 6 weeks ago she stubbed her toe on the bathtub.  She had x-rays taken at that time.  Followed up 3 weeks later for another repeat x-ray.  It was still red and throbbing at that point.  She was started on an antibiotic.  She also notes that she was off work for the last 2 weeks and thinks that that helped a lot to with the pain and redness.  Wondering if she needs to continue to take the antibiotics and what else can be done for the foot.    Review of Systems:  Patient denies fever, chills, rash, wound, stiffness, numbness, weakness, heart burn, blood in stool, chest pain with activity, calf pain when walking, shortness of breath with activity, chronic cough, easy bleeding/bruising, swelling of ankles, excessive thirst, fatigue, depression, anxiety.  Patient admits to limping at times  .     PAST MEDICAL HISTORY:   Past Medical History:   Diagnosis Date     CLL (chronic lymphocytic leukemia) (H)     sees oncology Dr Arroyo         PAST SURGICAL HISTORY:   Past Surgical History:   Procedure Laterality Date     CATARACT IOL, RT/LT  2018     GYN SURGERY       HYSTERECTOMY  2005    ovaries and tubes removed - fibroids      KERATOTOMY ARCUATE WITH FEMTOSECOND LASER/IMAGING FOR ATIOL Left 2/13/2018    Procedure: KERATOTOMY ARCUATE WITH FEMTOSECOND LASER/IMAGING FOR ATIOL;  LEFT EYE FEMTOSECOND LASER CAPSULOTOMY; LENS FRAGMENTATION; ARCUATE INCISIONS;  Surgeon: Sonu Allen MD;  Location: [facility]     PHACOEMULSIFICATION CLEAR CORNEA WITH DELUXE INTRAOCULAR LENS IMPLANT Left 2/13/2018    Procedure: PHACOEMULSIFICATION CLEAR CORNEA WITH DELUXE INTRAOCULAR LENS IMPLANT;  LEFT EYE FEMTOSECOND LASER ASSISTED PHACOEMULSIFICATION CLEAR CORNEA WITH DELUXE MULTIFOCAL INTRAOCULAR LENS IMPLANT;  Surgeon: Sonu Allen MD;  Location: [facility]  "       MEDICATIONS:   Current Outpatient Medications:      Acetaminophen (TYLENOL) 325 MG CAPS, Take 325-650 mg by mouth every 4 hours as needed, Disp: , Rfl:      cephALEXin (KEFLEX) 500 MG capsule, Take 1 capsule (500 mg) by mouth 3 times daily for 7 days, Disp: 21 capsule, Rfl: 0     IBUPROFEN PO, , Disp: , Rfl:      multivitamin, therapeutic with minerals (MULTI-VITAMIN) TABS tablet, Take 1 tablet by mouth daily, Disp: 100 tablet, Rfl: 3     Omega-3 Fatty Acids (FISH OIL CONCENTRATE PO), Take by mouth daily, Disp: , Rfl:      ALLERGIES:  No Known Allergies     SOCIAL HISTORY:   Social History     Socioeconomic History     Marital status:      Spouse name: Not on file     Number of children: Not on file     Years of education: Not on file     Highest education level: Not on file   Occupational History     Not on file   Tobacco Use     Smoking status: Never Smoker     Smokeless tobacco: Never Used   Substance and Sexual Activity     Alcohol use: No     Drug use: No     Sexual activity: Yes     Partners: Male   Other Topics Concern     Parent/sibling w/ CABG, MI or angioplasty before 65F 55M? Not Asked   Social History Narrative     Not on file     Social Determinants of Health     Financial Resource Strain: Not on file   Food Insecurity: Not on file   Transportation Needs: Not on file   Physical Activity: Not on file   Stress: Not on file   Social Connections: Not on file   Intimate Partner Violence: Not on file   Housing Stability: Not on file        FAMILY HISTORY:   Family History   Problem Relation Age of Onset     Diabetes Father      Lung Cancer Father         EXAM:Vitals: BP (!) 150/72   Ht 1.613 m (5' 3.5\")   Wt 63 kg (139 lb)   BMI 24.24 kg/m      General appearance: Patient is alert and fully cooperative with history & exam.  No sign of distress is noted during the visit.     Psychiatric: Affect is pleasant & appropriate.  Patient appears motivated to improve health.     Respiratory: Breathing " is regular & unlabored while sitting.     HEENT: Hearing is intact to spoken word.  Speech is clear.  No gross evidence of visual impairment that would impact ambulation.     Dermatologic: Minimal ecchymosis noted to the right fifth toe.  No tenting of the skin is noted.  No open lesions or signs of acute infection is noted.     Vascular: DP & PT pulses are intact & regular bilaterally.  No significant edema or varicosities noted.  CFT and skin temperature is normal to both lower extremities.     Neurologic: Lower extremity sensation is intact to light touch.  No evidence of weakness or contracture in the lower extremities.  No evidence of neuropathy.     Musculoskeletal: Patient is ambulatory without assistive device or brace.  Pain on palpation of the right fifth toe.    Radiographs: Right foot x-ray - I personally reviewed the xrays - Healing nondisplaced fracture of the distal portion of the proximal phalanx of the fifth toe with slight interval healing since the prior study. No evidence of additional fractures.     ASSESSMENT:    Pain of toe of right foot  Closed fracture of phalanx of right fifth toe with delayed healing     Medical Decision Making/Plan:  Reviewed patient's chart in [facility].  Reviewed and discussed x-rays. Talked about fractures. Discussed that healing can take 6-10 weeks. Risk that the fracture will not heal and we may need to do surgery. Risk is increased 10-15% if you smoke.     Discussed that her fracture is in the joint and I feel that because she is on her feet all day at work in the postop shoe that there was just too much movement at the fracture site and that is what caused it to be red.  She does not need to continue taking the antibiotics.  We will switch her from the postop shoe to a short Aircast boot to try to help immobilize the area more while she is at work.  She was also given a letter for work to elevate the foot frequently and sit down frequently.  We will have her follow-up  in 1 month for reassessment and rah-ray.  All questions were answered to patient satisfaction she will call for the questions or concerns.    Patient risk factor: Patient is at low risk for infection.        Angelina Eldridge DPM, Podiatry/Foot and Ankle Surgery

## 2022-04-12 NOTE — PATIENT INSTRUCTIONS
Thank you for choosing [facility] Podiatry / Foot & Ankle Surgery!    DR JUÁREZ'S CLINIC:  [facility]   [address]   [place] [address]      TRIAGE LINE: [phone]  APPOINTMENTS: [phone]  RADIOLOGY: [phone]  SET UP SURGERY: [phone]  BILLING QUESTIONS: [phone]  FAX: [phone]       Follow up: 4 weeks    TOE & METATARSAL FRACTURES  The structure of the foot is complex, consisting of bones, muscles, tendons, and other soft tissues. Of the 26 bones in the foot, 19 are toe bones (phalanges) and metatarsal bones (the long bones in the midfoot). Fractures of the toe and metatarsal bones are common and require evaluation by a specialist. A foot and ankle surgeon should be seen for proper diagnosis and treatment, even if initial treatment has been received in an emergency room.  A fracture is a break in the bone. Fractures can be divided into two categories: traumatic fractures and stress fractures.  TRAUMATIC FRACTURES (also called acute fractures) are caused by a direct blow or impact, such as seriously stubbing your toe. Traumatic fractures can be displaced or non-displaced. If the fracture is displaced, the bone is broken in such a way that it has changed in position (dislocated).  Signs and symptoms of a traumatic fracture include:  You may hear a sound at the time of the break.    Pinpoint pain  (pain at the place of impact) at the time the fracture occurs and perhaps for a few hours later, but often the pain goes away after several hours.   Crooked or abnormal appearance of the toe.   Bruising and swelling the next day.   It is not true that  if you can walk on it, it s not broken.  Evaluation by a foot and ankle surgeon is always recommended.   STRESS FRACTURES are tiny, hairline breaks that are usually caused by repetitive stress. Stress fractures often afflict athletes who, for example, too rapidly increase their running mileage. They can also be caused  by an abnormal foot structure, deformities, or osteoporosis. Improper footwear may also lead to stress fractures. Stress fractures should not be ignored. They require proper medical attention to heal correctly.  Symptoms of stress fractures include:  Pain with or after normal activity   Pain that goes away when resting and then returns when standing or during activity    Pinpoint pain  (pain at the site of the fracture) when touched   Swelling, but no bruising   IMPROPER TREATMENT  Some people say that  the doctor can t do anything for a broken bone in the foot.  This is usually not true. In fact, if a fractured toe or metatarsal bone is not treated correctly, serious complications may develop. For example:  A deformity in the bony architecture which may limit the ability to move the foot or cause difficulty in fitting shoes   Arthritis, which may be caused by a fracture in a joint (the juncture where two bones meet), or may be a result of angular deformities that develop when a displaced fracture is severe or hasn t been properly corrected   Chronic pain and deformity   Non-union, or failure to heal, can lead to subsequent surgery or chronic pain.   PROPER TREATMENT FOR TOES  Fractures of the toe bones are almost always traumatic fractures. Treatment for traumatic fractures depends on the break itself and may include these options:  Rest. Sometimes rest is all that is needed to treat a traumatic fracture of the toe.   Splinting. The toe may be fitted with a splint to keep it in a fixed position.   Rigid or stiff-soled shoe. Wearing a stiff-soled shoe protects the toe and helps keep it properly positioned.    Garry taping  the fractured toe to another toe is sometimes appropriate, but in other cases it may be harmful.   Surgery. If the break is badly displaced or if the joint is affected, surgery may be necessary. Surgery often involves the use of fixation devices, such as pins.   PROPER TREATMENT OF  METATARSALS  Breaks in the metatarsal bones may be either stress or traumatic fractures. Certain kinds of fractures of the metatarsal bones present unique challenges.  For example, sometimes a fracture of the first metatarsal bone (behind the big toe) can lead to arthritis. Since the big toe is used so frequently and bears more weight than other toes, arthritis in that area can make it painful to walk, bend, or even stand.  Another type of break, called a Ramírez fracture, occurs at the base of the fifth metatarsal bone (behind the little toe). It is often misdiagnosed as an ankle sprain, and misdiagnosis can have serious consequences since sprains and fractures require different treatments. Your foot and ankle surgeon is an expert in correctly identifying these conditions as well as other problems of the foot.  Treatment of metatarsal fractures depends on the type and extent of the fracture, and may include:  Rest. Sometimes rest is the only treatment needed to promote healing of a stress or traumatic fracture of a metatarsal bone.   Avoid the offending activity. Because stress fractures result from repetitive stress, it is important to avoid the activity that led to the fracture. Crutches or a wheelchair are sometimes required to offload weight from the foot to give it time to heal.   Immobilization, casting, or rigid shoe. A stiff-soled shoe or other form of immobilization may be used to protect the fractured bone while it is healing.   Surgery. Some traumatic fractures of the metatarsal bones require surgery, especially if the break is badly displaced.   Follow-up care. Your foot and ankle surgeon will provide instructions for care following surgical or non-surgical treatment. Physical therapy, exercises and rehabilitation may be included in a schedule for return to normal activities.

## 2022-04-13 NOTE — TELEPHONE ENCOUNTER
LVM for patient to call back for message below    Traci Arredondo [facility] 4/13/2022 4:38 PM

## 2022-05-16 ENCOUNTER — HEALTH MAINTENANCE LETTER (OUTPATIENT)
Age: 62
End: 2022-05-16

## 2022-05-17 ENCOUNTER — OFFICE VISIT (OUTPATIENT)
Dept: PODIATRY | Facility: CLINIC | Age: 62
End: 2022-05-17
Payer: COMMERCIAL

## 2022-05-17 VITALS — DIASTOLIC BLOOD PRESSURE: 72 MMHG | SYSTOLIC BLOOD PRESSURE: 128 MMHG

## 2022-05-17 DIAGNOSIS — M79.671 RIGHT FOOT PAIN: Primary | ICD-10-CM

## 2022-05-17 PROCEDURE — 99213 OFFICE O/P EST LOW 20 MIN: CPT | Performed by: PODIATRIST

## 2022-05-17 NOTE — LETTER
5/17/2022         RE: Mckenna Rios  [address] [address]        Dear Colleague,    Thank you for referring your patient, Mckenna Rios, to the [facility] PODI[facility]. Please see a copy of my visit note below.    Podiatry / Foot and Ankle Surgery Progress Note    May 17, 2022    Subject: Patient was seen for follow-up on right fifth toe fracture.  Has been in the boot for the last month.  Notes that over the last week the pain has significantly improved.  Denies fever, nausea, shortness of breath.  No concerns today.    Objective:  Vitals: /72     General:  Patient is alert and orientated.  NAD.    Dermatologic: Minimal ecchymosis noted to the right fifth toe.  No tenting of the skin is noted.  No open lesions or signs of acute infection is noted.     Vascular: DP & PT pulses are intact & regular bilaterally.  No significant edema or varicosities noted.  CFT and skin temperature is normal to both lower extremities.     Neurologic: Lower extremity sensation is intact to light touch.  No evidence of weakness or contracture in the lower extremities.  No evidence of neuropathy.     Musculoskeletal: Patient is ambulatory without assistive device or brace.  Pain on palpation of the right fifth toe.     Radiographs: Right foot x-ray - I personally reviewed the xrays - there does appear to be some bony consolidation across the fracture site at the proximal phalangeal joint of the right fifth toe.  No displacement of fracture fragments noted.     ASSESSMENT:    Pain of toe of right foot  Closed fracture of phalanx of right fifth toe with delayed healing     Medical Decision Making/Plan:  Reviewed patient's chart in [facility].    Reviewed and discussed x-rays with patient.  At this time she will start to transition out of the boot and into regular shoes.  We will have her wear shoes around the house for the next week in the boot outside of the house for the next week and  then after that she can go just to shoes if doing well.  We discussed that there might be some aching from time to time but if she is getting a lot of sharp pain sometimes there might be a step-off in the joint that we cannot see and would recommend an arthroplasty.  She would like to avoid surgery.  We will have her follow-up as needed. All questions were answered to patient satisfaction she will call for the questions or concerns.     Patient risk factor: Patient is at low risk for infection.      Angelina Eldridge DPM, Podiatry/Foot and Ankle Surgery        Again, thank you for allowing me to participate in the care of your patient.        Sincerely,        Angelina Eldridge DPM, Podiatry/Foot and Ankle Surgery

## 2022-05-17 NOTE — PATIENT INSTRUCTIONS
Thank you for choosing [facility] Podiatry / Foot & Ankle Surgery!    DR JUÁREZ'S CLINIC:  [facility]   [address]   [place] [address]      TRIAGE LINE: [phone]  APPOINTMENTS: [phone]  RADIOLOGY: [phone]  SET UP SURGERY: [phone]  BILLING QUESTIONS: [phone]  FAX: [phone]       Follow up: as needed.

## 2022-05-17 NOTE — PROGRESS NOTES
Podiatry / Foot and Ankle Surgery Progress Note    May 17, 2022    Subject: Patient was seen for follow-up on right fifth toe fracture.  Has been in the boot for the last month.  Notes that over the last week the pain has significantly improved.  Denies fever, nausea, shortness of breath.  No concerns today.    Objective:  Vitals: /72     General:  Patient is alert and orientated.  NAD.    Dermatologic: Minimal ecchymosis noted to the right fifth toe.  No tenting of the skin is noted.  No open lesions or signs of acute infection is noted.     Vascular: DP & PT pulses are intact & regular bilaterally.  No significant edema or varicosities noted.  CFT and skin temperature is normal to both lower extremities.     Neurologic: Lower extremity sensation is intact to light touch.  No evidence of weakness or contracture in the lower extremities.  No evidence of neuropathy.     Musculoskeletal: Patient is ambulatory without assistive device or brace.  Pain on palpation of the right fifth toe.     Radiographs: Right foot x-ray - I personally reviewed the xrays - there does appear to be some bony consolidation across the fracture site at the proximal phalangeal joint of the right fifth toe.  No displacement of fracture fragments noted.     ASSESSMENT:    Pain of toe of right foot  Closed fracture of phalanx of right fifth toe with delayed healing     Medical Decision Making/Plan:  Reviewed patient's chart in [facility].    Reviewed and discussed x-rays with patient.  At this time she will start to transition out of the boot and into regular shoes.  We will have her wear shoes around the house for the next week in the boot outside of the house for the next week and then after that she can go just to shoes if doing well.  We discussed that there might be some aching from time to time but if she is getting a lot of sharp pain sometimes there might be a step-off in the joint that we cannot see and would recommend an arthroplasty.   She would like to avoid surgery.  We will have her follow-up as needed. All questions were answered to patient satisfaction she will call for the questions or concerns.     Patient risk factor: Patient is at low risk for infection.      Angelina Eldridge DPM, Podiatry/Foot and Ankle Surgery

## 2022-07-05 ENCOUNTER — OFFICE VISIT (OUTPATIENT)
Dept: PODIATRY | Facility: CLINIC | Age: 62
End: 2022-07-05
Payer: COMMERCIAL

## 2022-07-05 VITALS — BODY MASS INDEX: 24.41 KG/M2 | SYSTOLIC BLOOD PRESSURE: 126 MMHG | WEIGHT: 140 LBS | DIASTOLIC BLOOD PRESSURE: 80 MMHG

## 2022-07-05 DIAGNOSIS — R60.0 EDEMA OF RIGHT FOOT: ICD-10-CM

## 2022-07-05 DIAGNOSIS — M79.671 RIGHT FOOT PAIN: Primary | ICD-10-CM

## 2022-07-05 PROCEDURE — 99213 OFFICE O/P EST LOW 20 MIN: CPT | Performed by: PODIATRIST

## 2022-07-05 NOTE — LETTER
7/5/2022         RE: Mckenna Rios  [address]  [place] [address]        Dear Colleague,    Thank you for referring your patient, Mckenna Rios, to the [facility] POD[facility]RY. Please see a copy of my visit note below.    PATIENT HISTORY:  Mckenna Rios is a 62 year old female who presents to clinic for continued swelling to the right foot.  Notes that when she is on her foot more it will swell little bit more sore.  Its been okay the last few days that she has been.  Here with significant other.  Wondering what can be done for the swelling.    Review of Systems:  Patient denies fever, chills, rash, wound, stiffness, limping, numbness, weakness, heart burn, blood in stool, chest pain with activity, calf pain when walking, shortness of breath with activity, chronic cough, easy bleeding/bruising,excessive thirst, fatigue, depression, anxiety.  Patient admits to swelling.     PAST MEDICAL HISTORY:   Past Medical History:   Diagnosis Date     CLL (chronic lymphocytic leukemia) (H)     sees oncology Dr Arroyo         PAST SURGICAL HISTORY:   Past Surgical History:   Procedure Laterality Date     CATARACT IOL, RT/LT  2018     GYN SURGERY       HYSTERECTOMY  2005    ovaries and tubes removed - fibroids      KERATOTOMY ARCUATE WITH FEMTOSECOND LASER/IMAGING FOR ATIOL Left 2/13/2018    Procedure: KERATOTOMY ARCUATE WITH FEMTOSECOND LASER/IMAGING FOR ATIOL;  LEFT EYE FEMTOSECOND LASER CAPSULOTOMY; LENS FRAGMENTATION; ARCUATE INCISIONS;  Surgeon: Sonu Allen MD;  Location: [facility]     PHACOEMULSIFICATION CLEAR CORNEA WITH DELUXE INTRAOCULAR LENS IMPLANT Left 2/13/2018    Procedure: PHACOEMULSIFICATION CLEAR CORNEA WITH DELUXE INTRAOCULAR LENS IMPLANT;  LEFT EYE FEMTOSECOND LASER ASSISTED PHACOEMULSIFICATION CLEAR CORNEA WITH DELUXE MULTIFOCAL INTRAOCULAR LENS IMPLANT;  Surgeon: Sonu Allen MD;  Location: [facility]        MEDICATIONS:   Current Outpatient  Medications:      Acetaminophen (TYLENOL) 325 MG CAPS, Take 325-650 mg by mouth every 4 hours as needed, Disp: , Rfl:      IBUPROFEN PO, , Disp: , Rfl:      multivitamin w/minerals (THERA-VIT-M) tablet, Take 1 tablet by mouth daily, Disp: 100 tablet, Rfl: 3     ALLERGIES:  No Known Allergies     SOCIAL HISTORY:   Social History     Socioeconomic History     Marital status:      Spouse name: Not on file     Number of children: Not on file     Years of education: Not on file     Highest education level: Not on file   Occupational History     Not on file   Tobacco Use     Smoking status: Never Smoker     Smokeless tobacco: Never Used   Substance and Sexual Activity     Alcohol use: No     Drug use: No     Sexual activity: Yes     Partners: Male   Other Topics Concern     Parent/sibling w/ CABG, MI or angioplasty before 65F 55M? Not Asked   Social History Narrative     Not on file     Social Determinants of Health     Financial Resource Strain: Not on file   Food Insecurity: Not on file   Transportation Needs: Not on file   Physical Activity: Not on file   Stress: Not on file   Social Connections: Not on file   Intimate Partner Violence: Not on file   Housing Stability: Not on file        FAMILY HISTORY:   Family History   Problem Relation Age of Onset     Diabetes Father      Lung Cancer Father         EXAM:Vitals: /80   Wt 63.5 kg (140 lb)   BMI 24.41 kg/m    BMI= Body mass index is 24.41 kg/m .    General appearance: Patient is alert and fully cooperative with history & exam.  No sign of distress is noted during the visit.     Psychiatric: Affect is pleasant & appropriate.  Patient appears motivated to improve health.     Respiratory: Breathing is regular & unlabored while sitting.     HEENT: Hearing is intact to spoken word.  Speech is clear.  No gross evidence of visual impairment that would impact ambulation.     Dermatologic: Minimal ecchymosis noted to the right fifth toe.  No tenting of the skin  is noted.  No open lesions or signs of acute infection is noted.     Vascular: DP & PT pulses are intact & regular bilaterally.  No significant edema or varicosities noted.  CFT and skin temperature is normal to both lower extremities.     Neurologic: Lower extremity sensation is intact to light touch.  No evidence of weakness or contracture in the lower extremities.  No evidence of neuropathy.     Musculoskeletal: Patient is ambulatory without assistive device or brace.  Pain on palpation of the right fifth toe.     Radiographs: Right foot x-ray - I personally reviewed the xrays - the fracture appears healed at this time with no fracture line noted.  No displacement of fracture fragments noted.     ASSESSMENT:     Right foot pain  Edema of right foot     Medical Decision Making/Plan:  Reviewed patient's chart in [facility].      Discussed with patient that swelling can happen after an injury and occur for up to 6 to 12 months.  I would recommend a compression to try to help with the swelling.  She will wear this during the day and take it off at night.  Also recommend elevating the foot and she can use over-the-counter pain cream to help with any discomfort to the toe.  Reviewed the x-rays and the fracture looks healed at this time.  We will have her follow-up as needed.  Can continue would recommend an MRI of the foot.  All questions were answered to patient satisfaction she will call for the questions or concerns.     Patient risk factor: Patient is at low risk for infection.       Angelina Eldridge DPM, Podiatry/Foot and Ankle Surgery        Again, thank you for allowing me to participate in the care of your patient.        Sincerely,        Angelina Eldridge DPM, Podiatry/Foot and Ankle Surgery

## 2022-07-05 NOTE — PROGRESS NOTES
PATIENT HISTORY:  Mckenna Rios is a 62 year old female who presents to clinic for continued swelling to the right foot.  Notes that when she is on her foot more it will swell little bit more sore.  Its been okay the last few days that she has been.  Here with significant other.  Wondering what can be done for the swelling.    Review of Systems:  Patient denies fever, chills, rash, wound, stiffness, limping, numbness, weakness, heart burn, blood in stool, chest pain with activity, calf pain when walking, shortness of breath with activity, chronic cough, easy bleeding/bruising,excessive thirst, fatigue, depression, anxiety.  Patient admits to swelling.     PAST MEDICAL HISTORY:   Past Medical History:   Diagnosis Date     CLL (chronic lymphocytic leukemia) (H)     sees oncology Dr Arroyo         PAST SURGICAL HISTORY:   Past Surgical History:   Procedure Laterality Date     CATARACT IOL, RT/LT  2018     GYN SURGERY       HYSTERECTOMY  2005    ovaries and tubes removed - fibroids      KERATOTOMY ARCUATE WITH FEMTOSECOND LASER/IMAGING FOR ATIOL Left 2/13/2018    Procedure: KERATOTOMY ARCUATE WITH FEMTOSECOND LASER/IMAGING FOR ATIOL;  LEFT EYE FEMTOSECOND LASER CAPSULOTOMY; LENS FRAGMENTATION; ARCUATE INCISIONS;  Surgeon: Sonu Allen MD;  Location: [facility]     PHACOEMULSIFICATION CLEAR CORNEA WITH DELUXE INTRAOCULAR LENS IMPLANT Left 2/13/2018    Procedure: PHACOEMULSIFICATION CLEAR CORNEA WITH DELUXE INTRAOCULAR LENS IMPLANT;  LEFT EYE FEMTOSECOND LASER ASSISTED PHACOEMULSIFICATION CLEAR CORNEA WITH DELUXE MULTIFOCAL INTRAOCULAR LENS IMPLANT;  Surgeon: Sonu Allen MD;  Location: [facility]        MEDICATIONS:   Current Outpatient Medications:      Acetaminophen (TYLENOL) 325 MG CAPS, Take 325-650 mg by mouth every 4 hours as needed, Disp: , Rfl:      IBUPROFEN PO, , Disp: , Rfl:      multivitamin w/minerals (THERA-VIT-M) tablet, Take 1 tablet by mouth daily, Disp: 100 tablet, Rfl: 3     ALLERGIES:   No Known Allergies     SOCIAL HISTORY:   Social History     Socioeconomic History     Marital status:      Spouse name: Not on file     Number of children: Not on file     Years of education: Not on file     Highest education level: Not on file   Occupational History     Not on file   Tobacco Use     Smoking status: Never Smoker     Smokeless tobacco: Never Used   Substance and Sexual Activity     Alcohol use: No     Drug use: No     Sexual activity: Yes     Partners: Male   Other Topics Concern     Parent/sibling w/ CABG, MI or angioplasty before 65F 55M? Not Asked   Social History Narrative     Not on file     Social Determinants of Health     Financial Resource Strain: Not on file   Food Insecurity: Not on file   Transportation Needs: Not on file   Physical Activity: Not on file   Stress: Not on file   Social Connections: Not on file   Intimate Partner Violence: Not on file   Housing Stability: Not on file        FAMILY HISTORY:   Family History   Problem Relation Age of Onset     Diabetes Father      Lung Cancer Father         EXAM:Vitals: /80   Wt 63.5 kg (140 lb)   BMI 24.41 kg/m    BMI= Body mass index is 24.41 kg/m .    General appearance: Patient is alert and fully cooperative with history & exam.  No sign of distress is noted during the visit.     Psychiatric: Affect is pleasant & appropriate.  Patient appears motivated to improve health.     Respiratory: Breathing is regular & unlabored while sitting.     HEENT: Hearing is intact to spoken word.  Speech is clear.  No gross evidence of visual impairment that would impact ambulation.     Dermatologic: Minimal ecchymosis noted to the right fifth toe.  No tenting of the skin is noted.  No open lesions or signs of acute infection is noted.     Vascular: DP & PT pulses are intact & regular bilaterally.  No significant edema or varicosities noted.  CFT and skin temperature is normal to both lower extremities.     Neurologic: Lower extremity  sensation is intact to light touch.  No evidence of weakness or contracture in the lower extremities.  No evidence of neuropathy.     Musculoskeletal: Patient is ambulatory without assistive device or brace.  Pain on palpation of the right fifth toe.     Radiographs: Right foot x-ray - I personally reviewed the xrays - the fracture appears healed at this time with no fracture line noted.  No displacement of fracture fragments noted.     ASSESSMENT:     Right foot pain  Edema of right foot     Medical Decision Making/Plan:  Reviewed patient's chart in [facility].      Discussed with patient that swelling can happen after an injury and occur for up to 6 to 12 months.  I would recommend a compression to try to help with the swelling.  She will wear this during the day and take it off at night.  Also recommend elevating the foot and she can use over-the-counter pain cream to help with any discomfort to the toe.  Reviewed the x-rays and the fracture looks healed at this time.  We will have her follow-up as needed.  Can continue would recommend an MRI of the foot.  All questions were answered to patient satisfaction she will call for the questions or concerns.     Patient risk factor: Patient is at low risk for infection.       Angelina Eldridge DPM, Podiatry/Foot and Ankle Surgery

## 2022-07-05 NOTE — PATIENT INSTRUCTIONS
Thank you for choosing [facility] Podiatry / Foot & Ankle Surgery!    DR JUÁREZ'S CLINIC:  [facility]   [address]   [place] [address]      TRIAGE LINE: [phone]  APPOINTMENTS: [phone]  RADIOLOGY: [phone]  SET UP SURGERY: [phone]  FAX NUMBER: [phone]  BILLING QUESTIONS: [phone]       Follow up: as needed.       EDEMA (SWELLING)  Swollen ankles and swollen feet are common and usually not cause for concern, particularly if you have been standing or walking a lot. But feet and ankles that stay swollen or are accompanied by other symptoms could signal a serious health problem.     1. Pregnancy complications: Some swelling of the ankles and feet is normal during pregnancy. Sudden or excessive swelling, however, may be a sign of preeclampsia, a serious condition in which high blood pressure and protein in the urine develop after the 20th week of pregnancy. If you experience severe swelling or swelling accompanied by other symptoms such as abdominal pain, headaches, infrequent urination, nausea and vomiting, or vision changes, call your doctor immediately.    2. Foot or ankle injury or surgery: An injury to the foot or ankle can lead to swelling. The most common is a sprained ankle, which occurs when an injury or misstep causes the ligaments that hold the ankle in place to be stretched beyond their normal range. To reduce the swelling from a foot or ankle injury, rest to avoid walking on the injured ankle or foot, use ice packs, wrap the foot or ankle with compression bandage, and elevate the foot on a stool or pillow.    3. Lymphedema: This is a collection of lymphatic fluid in the tissues that can develop because of the absence of or problems with the lymph vessels or after the removal of lymph nodes. Lymph is a protein-rich fluid that normally travels along an extensive network of vessels and capillaries. It is filtered through the lymph nodes, which  trap and destroy unwanted substances, such as bacteria. When there is a problem with the vessels or lymph nodes, however, the fluid's movement can be blocked. Untreated, lymph buildup can impair wound healing and lead to infection and deformity. Lymphedema is common following radiation therapy or removal of the lymph nodes in patients with cancer. Can also be due to certain conditions such as spina bifida, heart disease, kidney disease, liver disease, obesity, or even from medications.    4. Venous insufficiency (Moste Common): Swelling of the ankles and feet is often an early symptom of venous insufficiency, a condition in which blood inadequately moves up the veins from the legs and feet up to the heart. Normally, the veins keep blood flowing upward with one-way valves. When these valves become damaged or weakened, the blood leaks back down the vessels and fluid is retained in the soft tissue of the lower legs, especially the ankles and feet. Chronic venous insufficiency can lead to skin changes, skin ulcers, and infection.    TREATMENT  1.  Compression - Either with compression stockings, ace bandages, wraps, or leg pumps. Compression is the most important treatment and usually the 1st step.   2.  Thermal ablation - This is done by a vascular surgeon to the veins.  3.  Phlebectomy - Surgical removal of the veins or varicosities.  Again done by a vascular surgeon.  4.  Scleropathy - Laser removal of veins (also done by vascular surgeon).    PREVENTION  1.  Exercise to improve circulation and fluid distribution.  2.  Eat a healthy diet; too much salt may cause fluid retention, hypertension and swelling.  3.  Interrupt sitting or standing several times a day and elevate the feet and ankles above the heart  4. Lose excess weight to retain less fluid and decrease pressure on muscles and joints  5. Consider using support stockings or hose   6. Examine prescription and other medications; consult with the doctor if  medication may be responsible for fluid retention   7. Avoid smoking, alcohol and other substances that can lead to underlying causes of swelling

## 2022-09-11 ENCOUNTER — HEALTH MAINTENANCE LETTER (OUTPATIENT)
Age: 62
End: 2022-09-11

## 2022-10-17 ENCOUNTER — HOSPITAL ENCOUNTER (OUTPATIENT)
Dept: MAMMOGRAPHY | Facility: CLINIC | Age: 62
Discharge: HOME | End: 2022-10-17
Admitting: RADIOLOGY
Payer: COMMERCIAL

## 2022-10-17 DIAGNOSIS — Z12.31 VISIT FOR SCREENING MAMMOGRAM: ICD-10-CM

## 2022-10-17 PROCEDURE — 77067 SCR MAMMO BI INCL CAD: CPT

## 2023-05-06 ENCOUNTER — HEALTH MAINTENANCE LETTER (OUTPATIENT)
Age: 63
End: 2023-05-06

## 2023-08-14 ENCOUNTER — OFFICE VISIT (OUTPATIENT)
Dept: URGENT CARE | Facility: URGENT CARE | Age: 63
End: 2023-08-14
Payer: COMMERCIAL

## 2023-08-14 VITALS
OXYGEN SATURATION: 96 % | HEART RATE: 78 BPM | TEMPERATURE: 98 F | RESPIRATION RATE: 20 BRPM | DIASTOLIC BLOOD PRESSURE: 74 MMHG | SYSTOLIC BLOOD PRESSURE: 147 MMHG

## 2023-08-14 DIAGNOSIS — J22 LOWER RESP. TRACT INFECTION: Primary | ICD-10-CM

## 2023-08-14 PROCEDURE — 99214 OFFICE O/P EST MOD 30 MIN: CPT | Performed by: PHYSICIAN ASSISTANT

## 2023-08-14 RX ORDER — AZITHROMYCIN 250 MG/1
TABLET, FILM COATED ORAL
Qty: 6 TABLET | Refills: 0 | Status: SHIPPED | OUTPATIENT
Start: 2023-08-14 | End: 2024-05-31

## 2023-08-14 RX ORDER — BENZONATATE 200 MG/1
200 CAPSULE ORAL 3 TIMES DAILY PRN
Qty: 21 CAPSULE | Refills: 0 | Status: SHIPPED | OUTPATIENT
Start: 2023-08-14 | End: 2024-05-31

## 2023-08-14 NOTE — PROGRESS NOTES
SUBJECTIVE:  Mckenna Rios is a 63 year old female who comes in with a 2-week history of cough and occasional wheezing.  Patient does states that she feels short of breath at times.  Cough is slightly productive at times.  She has no history of asthma and has never used an inhaler.  He said her symptoms started as a sore throat and ears but that is since resolved.  She has no significant nasal congestion.  Does feel slightly rundown.  Did have a low-grade fever for 1 day when symptoms began but have been resolved since.  She is otherwise in normal state of good health.  He has been taking over-the-counter med for symptomatic relief.      Past Medical History:   Diagnosis Date    CLL (chronic lymphocytic leukemia) (H)     sees oncology Dr Arroyo      Patient Active Problem List   Diagnosis    CLL (chronic lymphocytic leukemia) (H)     Current Outpatient Medications   Medication    multivitamin w/minerals (THERA-VIT-M) tablet    Acetaminophen (TYLENOL) 325 MG CAPS    IBUPROFEN PO     No current facility-administered medications for this visit.     Social History     Socioeconomic History    Marital status:      Spouse name: Not on file    Number of children: Not on file    Years of education: Not on file    Highest education level: Not on file   Occupational History    Not on file   Tobacco Use    Smoking status: Never    Smokeless tobacco: Never   Substance and Sexual Activity    Alcohol use: No    Drug use: No    Sexual activity: Yes     Partners: Male   Other Topics Concern    Parent/sibling w/ CABG, MI or angioplasty before 65F 55M? Not Asked   Social History Narrative    Not on file     Social Determinants of Health     Financial Resource Strain: Not on file   Food Insecurity: Not on file   Transportation Needs: Not on file   Physical Activity: Not on file   Stress: Not on file   Social Connections: Not on file   Intimate Partner Violence: Not on file   Housing Stability: Not on file     ROS  negative  other than stated above    Exam:  GENERAL APPEARANCE: healthy, alert and no distress  EYES: EOMI,  PERRL  HENT: ear canals and TM's normal and nose and mouth without ulcers or lesions  RESP: lungs clear to auscultation - no rales, rhonchi or wheezes  CV: regular rates and rhythm, normal S1 S2, no S3 or S4 and no murmur, click or rub -  SKIN: no suspicious lesions or rashes    assessment/plan:  (J22) Lower resp. tract infection  (primary encounter diagnosis)  Comment:   Plan: azithromycin (ZITHROMAX) 250 MG tablet,         benzonatate (TESSALON) 200 MG capsule          Patient with a 2-week history of cough and congestion.  Lungs are clear on exam.  There is no wheezing noted.  Patient does have CLL and higher risk and will treat with Zithromax.  Tessalon Perles as needed.  Advise she may continue with over-the-counter med for symptomatic relief.  Red flag signs were discussed we will follow-up with primary as needed  \

## 2023-08-25 ENCOUNTER — TRANSFERRED RECORDS (OUTPATIENT)
Dept: HEALTH INFORMATION MANAGEMENT | Facility: CLINIC | Age: 63
End: 2023-08-25
Payer: COMMERCIAL

## 2023-10-27 ENCOUNTER — HOSPITAL ENCOUNTER (OUTPATIENT)
Dept: MAMMOGRAPHY | Facility: CLINIC | Age: 63
Discharge: HOME | End: 2023-10-27
Attending: PHYSICIAN ASSISTANT | Admitting: PHYSICIAN ASSISTANT
Payer: COMMERCIAL

## 2023-10-27 DIAGNOSIS — Z12.31 VISIT FOR SCREENING MAMMOGRAM: ICD-10-CM

## 2023-10-27 PROCEDURE — 77067 SCR MAMMO BI INCL CAD: CPT

## 2024-05-17 ENCOUNTER — TELEPHONE (OUTPATIENT)
Dept: FAMILY MEDICINE | Facility: CLINIC | Age: 64
End: 2024-05-17
Payer: COMMERCIAL

## 2024-05-17 DIAGNOSIS — Z86.0100 HISTORY OF COLONIC POLYPS: Primary | ICD-10-CM

## 2024-05-17 NOTE — TELEPHONE ENCOUNTER
Order/Referral Request    Who is requesting:   The patient    Orders being requested:   Colonoscopy Order requested    Reason service is needed/diagnosis: overdue screening     When are orders needed by: as soon as able    Has this been discussed with Provider: No    Does patient have a preference on a Group/Provider/Facility? yes    Does patient have an appointment scheduled?: No    Where to send orders: Place orders within Epic    Could we send this information to you in [facility] or would you prefer to receive a phone call?:   Patient would prefer a phone call   Okay to leave a detailed message?: Yes at Cell number on file:    Telephone Information:   Mobile [phone]

## 2024-05-17 NOTE — TELEPHONE ENCOUNTER
I know nothing about this patient's prior colonoscopy history and cannot confirm she is due. Can she get us records to review at her next appt?

## 2024-05-17 NOTE — TELEPHONE ENCOUNTER
TC, was able to talk with the Pt about the message below from the provider.     Pt says that she can try and get those records for her upcoming appt w/ [facility]. Pt also expressed that she may schedule w/ the previous place where she received the service for her colonoscopy so that she is not waiting; its been 3 yr since the last one was done.     If the pt decides to schedule else where, she will update the clinic about that change.     Sending the Pt's response to the provider as a FYI.     Bonnie Amos     [facility]

## 2024-05-23 NOTE — TELEPHONE ENCOUNTER
LVM message requesting a call back for an appt. Two more attempts will be made.     Bonnie Amos     [facility]

## 2024-05-23 NOTE — TELEPHONE ENCOUNTER
We received records of a colonoscopy and pathology from 2016 - does she think this was the most recent or did she have one more recently than that?

## 2024-05-24 NOTE — TELEPHONE ENCOUNTER
Reached out to the Pt in regards to relaying the provider's message below.     Pt expressed understanding of the message and was provided with the scheduling number for her to get the colonoscopy done.     Bonnie Amos     [facility]

## 2024-05-24 NOTE — TELEPHONE ENCOUNTER
Pt has returned the call that was left yesterday.     Pt has responded to the question listed below; per pt: She has not had another colonoscopy since 2016.     Will send this back to the provider for review.     Bonnie Amos     [facility]

## 2024-05-30 ENCOUNTER — TELEPHONE (OUTPATIENT)
Dept: GASTROENTEROLOGY | Facility: CLINIC | Age: 64
End: 2024-05-30
Payer: COMMERCIAL

## 2024-05-30 VITALS — BODY MASS INDEX: 20.83 KG/M2 | WEIGHT: 122 LBS | HEIGHT: 64 IN

## 2024-05-30 SDOH — HEALTH STABILITY: PHYSICAL HEALTH: ON AVERAGE, HOW MANY DAYS PER WEEK DO YOU ENGAGE IN MODERATE TO STRENUOUS EXERCISE (LIKE A BRISK WALK)?: 5 DAYS

## 2024-05-30 SDOH — HEALTH STABILITY: PHYSICAL HEALTH: ON AVERAGE, HOW MANY MINUTES DO YOU ENGAGE IN EXERCISE AT THIS LEVEL?: 50 MIN

## 2024-05-30 ASSESSMENT — SOCIAL DETERMINANTS OF HEALTH (SDOH): HOW OFTEN DO YOU GET TOGETHER WITH FRIENDS OR RELATIVES?: PATIENT DECLINED

## 2024-05-30 NOTE — TELEPHONE ENCOUNTER
"Endoscopy Scheduling Screen    Have you had a positive Covid test in the last 14 days?  No    What is your communication preference for Instructions and/or Bowel Prep?   MyChart    What insurance is in the chart?  Other:  bcbs    Ordering/Referring Provider:     ASHLEY MAYORGA      (If ordering provider performs procedure, schedule with ordering provider unless otherwise instructed. )    BMI: Estimated body mass index is 24.41 kg/m  as calculated from the following:    Height as of 4/12/22: 1.613 m (5' 3.5\").    Weight as of 7/5/22: 63.5 kg (140 lb).     Sedation Ordered  moderate sedation.   If patient BMI > 50 do not schedule in [facility].    If patient BMI > 45 do not schedule at [facility].    Are you taking methadone or Suboxone?  No    Have you had difficulties, pain, or discomfort during past endoscopy procedures?  No    Are you taking any prescription medications for pain 3 or more times per week?   NO, No RN review required.    Do you have a history of malignant hyperthermia?  No    (Females) Are you currently pregnant?   No     Have you been diagnosed or told you have pulmonary hypertension?   No    Do you have an LVAD?  No    Have you been told you have moderate to severe sleep apnea?  No    Have you been told you have COPD, asthma, or any other lung disease?  No    Do you have any heart conditions?  No     Have you ever had or are you waiting for an organ transplant?  No. Continue scheduling, no site restrictions.    Have you had a stroke or transient ischemic attack (TIA aka \"mini stroke\" in the last 6 months?   No    Have you been diagnosed with or been told you have cirrhosis of the liver?   No    Are you currently on dialysis?   No    Do you need assistance transferring?   No    BMI: Estimated body mass index is 24.41 kg/m  as calculated from the following:    Height as of 4/12/22: 1.613 m (5' 3.5\").    Weight as of 7/5/22: 63.5 kg (140 lb).     Is patients BMI > 40 and scheduling location [facility]?  No    Do " you take an injectable medication for weight loss or diabetes (excluding insulin)?  No    Do you take the medication Naltrexone?  No    Do you take blood thinners?  No       Prep   Are you currently on dialysis or do you have chronic kidney disease?  No    Do you have a diagnosis of diabetes?  No    Do you have a diagnosis of cystic fibrosis (CF)?  No    On a regular basis do you go 3 -5 days between bowel movements?  No    BMI > 40?  No    Preferred Pharmacy:    [facility]/pharmacy #[id number] - [place], [place] - [address] [address] [facility]  [address] [address] [address]  Phone: [phone] Fax: [phone]      Final Scheduling Details     Procedure scheduled  Colonoscopy    Surgeon:  Ade     Date of procedure:  8/9/24     Pre-OP / PAC:   No - Not required for this site.    Location  [facility] - Per order.    Sedation   Moderate Sedation - Per order.      Patient Reminders:   You will receive a call from a Nurse to review instructions and health history.  This assessment must be completed prior to your procedure.  Failure to complete the Nurse assessment may result in the procedure being cancelled.      On the day of your procedure, please designate an adult(s) who can drive you home stay with you for the next 24 hours. The medicines used in the exam will make you sleepy. You will not be able to drive.      You cannot take public transportation, ride share services, or non-medical taxi service without a responsible caregiver.  Medical transport services are allowed with the requirement that a responsible caregiver will receive you at your destination.  We require that drivers and caregivers are confirmed prior to your procedure.

## 2024-05-31 ENCOUNTER — OFFICE VISIT (OUTPATIENT)
Dept: FAMILY MEDICINE | Facility: CLINIC | Age: 64
End: 2024-05-31
Payer: COMMERCIAL

## 2024-05-31 VITALS
DIASTOLIC BLOOD PRESSURE: 69 MMHG | TEMPERATURE: 97.6 F | HEART RATE: 78 BPM | HEIGHT: 63 IN | SYSTOLIC BLOOD PRESSURE: 127 MMHG | BODY MASS INDEX: 22.29 KG/M2 | OXYGEN SATURATION: 99 % | RESPIRATION RATE: 16 BRPM | WEIGHT: 125.8 LBS

## 2024-05-31 DIAGNOSIS — C91.10 CLL (CHRONIC LYMPHOCYTIC LEUKEMIA) (H): ICD-10-CM

## 2024-05-31 DIAGNOSIS — Z00.00 ROUTINE GENERAL MEDICAL EXAMINATION AT A HEALTH CARE FACILITY: Primary | ICD-10-CM

## 2024-05-31 PROCEDURE — 82947 ASSAY GLUCOSE BLOOD QUANT: CPT | Performed by: PHYSICIAN ASSISTANT

## 2024-05-31 PROCEDURE — 36415 COLL VENOUS BLD VENIPUNCTURE: CPT | Performed by: PHYSICIAN ASSISTANT

## 2024-05-31 PROCEDURE — 80061 LIPID PANEL: CPT | Performed by: PHYSICIAN ASSISTANT

## 2024-05-31 PROCEDURE — 99397 PER PM REEVAL EST PAT 65+ YR: CPT | Performed by: PHYSICIAN ASSISTANT

## 2024-05-31 ASSESSMENT — PAIN SCALES - GENERAL: PAINLEVEL: NO PAIN (0)

## 2024-05-31 NOTE — PROGRESS NOTES
Preventive Care Visit  [facility] ROXI Zheng PA-C, Family Medicine  May 31, 2024      Assessment & Plan     Routine general medical examination at a health care facility  Reviewed personal and family history. Reviewed age appropriate screenings. Recommended any needed vaccinations. She declines  - DEXA HIP/PELVIS/SPINE - Future; Future  - MA Screen Bilateral w/Dimitri; Future  - Lipid panel reflex to direct LDL Fasting; Future  - Glucose; Future  - Lipid panel reflex to direct LDL Fasting  - Glucose    CLL (chronic lymphocytic leukemia) (H)  Follows with [facility]. Stable         Counseling  Appropriate preventive services were discussed with this patient, including applicable screening as appropriate for fall prevention, nutrition, physical activity, Tobacco-use cessation, weight loss and cognition.  Checklist reviewing preventive services available has been given to the patient.  Reviewed patient's diet, addressing concerns and/or questions.         Oscar Andres is a 64 year old, presenting for the following:  Physical        5/31/2024     8:16 AM   Additional Questions   Roomed by Steffi LÓPEZ CMA   Accompanied by NA         5/31/2024     8:16 AM   Patient Reported Additional Medications   Patient reports taking the following new medications None        Health Care Directive  Patient does not have a Health Care Directive or Living Will: Discussed advance care planning with patient; however, patient declined at this time.    HPI        5/30/2024   General Health   How would you rate your overall physical health? Excellent   Feel stress (tense, anxious, or unable to sleep) Not at all         5/30/2024   Nutrition   Diet: Regular (no restrictions)         5/30/2024   Exercise   Days per week of moderate/strenous exercise 5 days   Average minutes spent exercising at this level 50 min         5/30/2024   Social Factors   Frequency of gathering with friends or relatives Patient declined    Worry food won't last until get money to buy more No   Food not last or not have enough money for food? No   Do you have housing?  Yes   Are you worried about losing your housing? No   Lack of transportation? No   Unable to get utilities (heat,electricity)? No         5/30/2024   Fall Risk   Fallen 2 or more times in the past year? No    No   Trouble with walking or balance? No    No          5/30/2024   Activities of Daily Living- Home Safety   Needs help with the following daily activites None of the above   Safety concerns in the home None of the above         5/30/2024   Dental   Dentist two times every year? Yes         5/30/2024   Hearing Screening   Hearing concerns? None of the above         5/30/2024   Driving Risk Screening   Patient/family members have concerns about driving No         5/30/2024   General Alertness/Fatigue Screening   Have you been more tired than usual lately? No         5/30/2024   Urinary Incontinence Screening   Bothered by leaking urine in past 6 months No         5/30/2024   TB Screening   Were you born outside of the [place]? No         Today's PHQ-2 Score:       5/30/2024     7:33 AM   PHQ-2 ( 1999 [employer])   Q1: Little interest or pleasure in doing things 0   Q2: Feeling down, depressed or hopeless 0   PHQ-2 Score 0   Q1: Little interest or pleasure in doing things Not at all   Q2: Feeling down, depressed or hopeless Not at all   PHQ-2 Score 0           5/30/2024   Substance Use   Alcohol more than 3/day or more than 7/wk Not Applicable   Do you have a current opioid prescription? No   How severe/bad is pain from 1 to 10? 0/10 (No Pain)   Do you use any other substances recreationally? No     Social History     Tobacco Use    Smoking status: Never     Passive exposure: Never    Smokeless tobacco: Never   Vaping Use    Vaping status: Never Used   Substance Use Topics    Alcohol use: No    Drug use: No           10/27/2023   LAST FHS-7 RESULTS   1st degree relative breast or ovarian  cancer No   Any relative bilateral breast cancer No   Any male have breast cancer No   Any ONE woman have BOTH breast AND ovarian cancer No   Any woman with breast cancer before 50yrs Yes   2 or more relatives with breast AND/OR ovarian cancer No   2 or more relatives with breast AND/OR bowel cancer No              History of abnormal Pap smear: No - age 65 or older with adequate negative prior screening test results (3 consecutive negative cytology results, 2 consecutive negative cotesting results, or 2 consecutive negative HrHPV test results within 10 years, with the most recent test occurring within the recommended screening interval for the test used)        Latest Ref Rng & Units 9/12/2018     9:25 AM 9/12/2018     9:17 AM 11/1/2015    12:00 AM   PAP / HPV   PAP (Historical)   NIL  Negative       HPV 16 DNA NEG^Negative Negative      HPV 18 DNA NEG^Negative Negative      Other HR HPV NEG^Negative Negative          This result is from an external source.     ASCVD Risk   The ASCVD Risk score (Ronda DK, et al., 2019) failed to calculate for the following reasons:    Cannot find a previous HDL lab    Cannot find a previous total cholesterol lab            Reviewed and updated as needed this visit by Provider                    Lab work is in process  Labs reviewed in EPIC  Current providers sharing in care for this patient include:  Patient Care Team:  Alexis Zheng PA-C as PCP - General (Family Medicine)    The following health maintenance items are reviewed in Epic and correct as of today:  Health Maintenance   Topic Date Due    DEXA  Never done    ANNUAL REVIEW OF HM ORDERS  Never done    ADVANCE CARE PLANNING  Never done    Pneumococcal Vaccine: 65+ Years (1 of 2 - PCV) Never done    ZOSTER IMMUNIZATION (1 of 2) Never done    RSV VACCINE (Pregnancy & 60+) (1 - 1-dose 60+ series) Never done    COVID-19 Vaccine (3 - Moderna risk series) 06/12/2021    GLUCOSE  09/12/2021    MEDICARE ANNUAL  "WELLNESS VISIT  Never done    LIPID  09/12/2023    COLORECTAL CANCER SCREENING  05/23/2024    INFLUENZA VACCINE (Season Ended) 09/01/2024    FALL RISK ASSESSMENT  05/31/2025    DTAP/TDAP/TD IMMUNIZATION (2 - Td or Tdap) 09/12/2025    MAMMO SCREENING  10/27/2025    HEPATITIS C SCREENING  Completed    PHQ-2 (once per calendar year)  Completed    IPV IMMUNIZATION  Aged Out    HPV IMMUNIZATION  Aged Out    MENINGITIS IMMUNIZATION  Aged Out    RSV MONOCLONAL ANTIBODY  Aged Out    PAP  Discontinued         Review of Systems  Constitutional, HEENT, cardiovascular, pulmonary, gi and gu systems are negative, except as otherwise noted.     Objective    Exam  /69 (BP Location: Right arm, Patient Position: Sitting, Cuff Size: Adult Regular)   Pulse 78   Temp 97.6  F (36.4  C) (Oral)   Resp 16   Ht 1.6 m (5' 3\")   Wt 57.1 kg (125 lb 12.8 oz)   SpO2 99%   BMI 22.28 kg/m     Estimated body mass index is 22.28 kg/m  as calculated from the following:    Height as of this encounter: 1.6 m (5' 3\").    Weight as of this encounter: 57.1 kg (125 lb 12.8 oz).    Physical Exam  GENERAL: alert and no distress  EYES: Eyes grossly normal to inspection, PERRL and conjunctivae and sclerae normal  HENT: ear canals and TM's normal, nose and mouth without ulcers or lesions  NECK: no adenopathy, no asymmetry, masses, or scars  RESP: lungs clear to auscultation - no rales, rhonchi or wheezes  CV: regular rate and rhythm, normal S1 S2, no S3 or S4, no murmur, click or rub, no peripheral edema  ABDOMEN: soft, nontender, no hepatosplenomegaly, no masses and bowel sounds normal  MS: no gross musculoskeletal defects noted, no edema  PSYCH: mentation appears normal, affect normal/bright        5/31/2024   Mini Cog   Clock Draw Score 2 Normal   3 Item Recall 3 objects recalled   Mini Cog Total Score 5             Signed Electronically by: Alexis Zheng PA-C    "

## 2024-05-31 NOTE — PATIENT INSTRUCTIONS
"Please check into when your last PAP/Cervical cancer screening is and get us the results    1200 mg of calcium (total of diet and supplement) and 800 international units of vitamin D daily       Preventive Care Advice   This is general advice we often give to help people stay healthy. Your care team may have specific advice just for you. Please talk to your care team about your own preventive care needs.  Lifestyle  Exercise at least 150 minutes each week (30 minutes a day, 5 days a week).  Do muscle strengthening activities 2 days a week. These help control your weight and prevent disease.  No smoking.  Wear sunscreen to prevent skin cancer.  Have your home tested for radon every 2 to 5 years. Radon is a colorless, odorless gas that can harm your lungs. To learn more, go to www.health.[facility].[place] and search for \"Radon in Homes.\"  Keep guns unloaded and locked up in a safe place like a safe or gun vault, or, use a gun lock and hide the keys. Always lock away bullets separately. To learn more, visit besomebody..[place].gov and search for \"safe gun storage.\"  Nutrition  Eat 5 or more servings of fruits and vegetables each day.  Try wheat bread, brown rice and whole grain pasta (instead of white bread, rice, and pasta).  Get enough calcium and vitamin D. Check the label on foods and aim for 100% of the RDA (recommended daily allowance).  Regular exams  Have a dental exam and cleaning every 6 months.  See your health care team every year to talk about:  Any changes in your health.  Any medicines your care team has prescribed.  Preventive care, family planning, and ways to prevent chronic diseases.  Shots (vaccines)   HPV shots (up to age 26), if you've never had them before.  Hepatitis B shots (up to age 59), if you've never had them before.  COVID-19 shot: Get this shot when it's due.  Flu shot: Get a flu shot every year.  Tetanus shot: Get a tetanus shot every 10 years.  Pneumococcal, hepatitis A, and RSV shots: Ask your care team " if you need these based on your risk.  Shingles shot (for age 50 and up).  General health tests  Diabetes screening:  Starting at age 35, Get screened for diabetes at least every 3 years.  If you are younger than age 35, ask your care team if you should be screened for diabetes.  Cholesterol test: At age 39, start having a cholesterol test every 5 years, or more often if advised.  Bone density scan (DEXA): At age 50, ask your care team if you should have this scan for osteoporosis (brittle bones).  Hepatitis C: Get tested at least once in your life.  Abdominal aortic aneurysm screening: Talk to your doctor about having this screening if you:  Have ever smoked; and  Are biologically male; and  Are between the ages of 65 and 75.  STIs (sexually transmitted infections)  Before age 24: Ask your care team if you should be screened for STIs.  After age 24: Get screened for STIs if you're at risk. You are at risk for STIs (including HIV) if:  You are sexually active with more than one person.  You don't use condoms every time.  You or a partner was diagnosed with a sexually transmitted infection.  If you are at risk for HIV, ask about PrEP medicine to prevent HIV.  Get tested for HIV at least once in your life, whether you are at risk for HIV or not.  Cancer screening tests  Cervical cancer screening: If you have a cervix, begin getting regular cervical cancer screening tests at age 21. Most people who have regular screenings with normal results can stop after age 65. Talk about this with your provider.  Breast cancer scan (mammogram): If you've ever had breasts, begin having regular mammograms starting at age 40. This is a scan to check for breast cancer.  Colon cancer screening: It is important to start screening for colon cancer at age 45.  Have a colonoscopy test every 10 years (or more often if you're at risk) Or, ask your provider about stool tests like a FIT test every year or Cologuard test every 3 years.  To learn  more about your testing options, visit: www.[employer]/617361.pdf.  For help making a decision, visit: jeff.kasi/fg93391.  Prostate cancer screening test: If you have a prostate and are age 55 to 69, ask your provider if you would benefit from a yearly prostate cancer screening test.  Lung cancer screening: If you are a current or former smoker age 50 to 80, ask your care team if ongoing lung cancer screenings are right for you.  For informational purposes only. Not to replace the advice of your health care provider. Copyright   2023 [employer]. All rights reserved. Clinically reviewed by the [facility] Transitions Program. [employer] 535815 - REV 04/24.

## 2024-06-01 LAB
CHOLEST SERPL-MCNC: 244 MG/DL
FASTING STATUS PATIENT QL REPORTED: YES
FASTING STATUS PATIENT QL REPORTED: YES
GLUCOSE SERPL-MCNC: 98 MG/DL (ref 70–99)
HDLC SERPL-MCNC: 82 MG/DL
LDLC SERPL CALC-MCNC: 151 MG/DL
NONHDLC SERPL-MCNC: 162 MG/DL
TRIGL SERPL-MCNC: 55 MG/DL

## 2024-06-03 ENCOUNTER — PATIENT OUTREACH (OUTPATIENT)
Dept: GASTROENTEROLOGY | Facility: CLINIC | Age: 64
End: 2024-06-03
Payer: COMMERCIAL

## 2024-06-03 ENCOUNTER — MYC MEDICAL ADVICE (OUTPATIENT)
Dept: FAMILY MEDICINE | Facility: CLINIC | Age: 64
End: 2024-06-03
Payer: COMMERCIAL

## 2024-07-19 NOTE — TELEPHONE ENCOUNTER
Standard Miralax Bowel Prep recommended due to standard bowel prep. Instructions were sent via [employer].

## 2024-07-30 ENCOUNTER — TELEPHONE (OUTPATIENT)
Dept: GASTROENTEROLOGY | Facility: CLINIC | Age: 64
End: 2024-07-30
Payer: COMMERCIAL

## 2024-07-30 NOTE — TELEPHONE ENCOUNTER
Pre assessment completed for upcoming procedure.      Procedure details:    Patient scheduled for Colonoscopy on 8/9/24.     Arrival time: 0700. Procedure time 0745    Facility location: [facility]; [address] [place] [id number]. Check in location: Main entrance, door #1 on the North side of the building under roundabout awning. DO NOT GO TO SURGERY/ED ENTRANCE.     Sedation type: Conscious sedation     Pre op exam needed? No.    Indication for procedure: screening     Designated  policy reviewed. Instructed to have someone stay 6 hours post procedure.       Chart review:     Electronic implanted devices? No    Recent diagnosis of diverticulitis within the last 6 weeks?  No      Medication review:    Diabetic? No    Anticoagulants? No    Weight loss medication/injectable? No.    NSAIDS? No    Other medication HOLDING recommendations:  N/A      Prep for procedure:     Bowel prep recommendation: Standard Miralax  Due to: standard bowel prep.    Prep instructions sent via [employer]     Reviewed procedure prep instructions.     Patient verbalized understanding and had no questions or concerns at this time.        Lesli Zepeda RN  Endoscopy Procedure Pre Assessment   [phone] option 4

## 2024-08-09 ENCOUNTER — HOSPITAL ENCOUNTER (OUTPATIENT)
Facility: CLINIC | Age: 64
Discharge: HOME | End: 2024-08-09
Attending: INTERNAL MEDICINE | Admitting: INTERNAL MEDICINE
Payer: COMMERCIAL

## 2024-08-09 VITALS
HEIGHT: 63 IN | HEART RATE: 67 BPM | RESPIRATION RATE: 16 BRPM | BODY MASS INDEX: 20.91 KG/M2 | DIASTOLIC BLOOD PRESSURE: 56 MMHG | OXYGEN SATURATION: 97 % | WEIGHT: 118 LBS | SYSTOLIC BLOOD PRESSURE: 91 MMHG

## 2024-08-09 LAB — COLONOSCOPY: NORMAL

## 2024-08-09 PROCEDURE — 250N000011 HC RX IP 250 OP 636: Performed by: INTERNAL MEDICINE

## 2024-08-09 PROCEDURE — G0500 MOD SEDAT ENDO SERVICE >5YRS: HCPCS | Performed by: INTERNAL MEDICINE

## 2024-08-09 PROCEDURE — 45378 DIAGNOSTIC COLONOSCOPY: CPT | Performed by: INTERNAL MEDICINE

## 2024-08-09 PROCEDURE — G0105 COLORECTAL SCRN; HI RISK IND: HCPCS | Performed by: INTERNAL MEDICINE

## 2024-08-09 RX ORDER — SIMETHICONE 40MG/0.6ML
133 SUSPENSION, DROPS(FINAL DOSAGE FORM)(ML) ORAL
Status: DISCONTINUED | OUTPATIENT
Start: 2024-08-09 | End: 2024-08-09 | Stop reason: HOSPADM

## 2024-08-09 RX ORDER — NALOXONE HYDROCHLORIDE 0.4 MG/ML
0.2 INJECTION, SOLUTION INTRAMUSCULAR; INTRAVENOUS; SUBCUTANEOUS
Status: DISCONTINUED | OUTPATIENT
Start: 2024-08-09 | End: 2024-08-09 | Stop reason: HOSPADM

## 2024-08-09 RX ORDER — DIPHENHYDRAMINE HYDROCHLORIDE 50 MG/ML
25-50 INJECTION INTRAMUSCULAR; INTRAVENOUS
Status: DISCONTINUED | OUTPATIENT
Start: 2024-08-09 | End: 2024-08-09 | Stop reason: HOSPADM

## 2024-08-09 RX ORDER — FENTANYL CITRATE 50 UG/ML
50-100 INJECTION, SOLUTION INTRAMUSCULAR; INTRAVENOUS EVERY 5 MIN PRN
Status: DISCONTINUED | OUTPATIENT
Start: 2024-08-09 | End: 2024-08-09 | Stop reason: HOSPADM

## 2024-08-09 RX ORDER — FLUMAZENIL 0.1 MG/ML
0.2 INJECTION, SOLUTION INTRAVENOUS
Status: DISCONTINUED | OUTPATIENT
Start: 2024-08-09 | End: 2024-08-09 | Stop reason: HOSPADM

## 2024-08-09 RX ORDER — EPINEPHRINE 1 MG/ML
0.1 INJECTION, SOLUTION INTRAMUSCULAR; SUBCUTANEOUS
Status: DISCONTINUED | OUTPATIENT
Start: 2024-08-09 | End: 2024-08-09 | Stop reason: HOSPADM

## 2024-08-09 RX ORDER — ATROPINE SULFATE 0.1 MG/ML
1 INJECTION INTRAVENOUS
Status: DISCONTINUED | OUTPATIENT
Start: 2024-08-09 | End: 2024-08-09 | Stop reason: HOSPADM

## 2024-08-09 RX ORDER — ONDANSETRON 2 MG/ML
4 INJECTION INTRAMUSCULAR; INTRAVENOUS
Status: DISCONTINUED | OUTPATIENT
Start: 2024-08-09 | End: 2024-08-09 | Stop reason: HOSPADM

## 2024-08-09 RX ORDER — NALOXONE HYDROCHLORIDE 0.4 MG/ML
0.4 INJECTION, SOLUTION INTRAMUSCULAR; INTRAVENOUS; SUBCUTANEOUS
Status: DISCONTINUED | OUTPATIENT
Start: 2024-08-09 | End: 2024-08-09 | Stop reason: HOSPADM

## 2024-08-09 RX ORDER — ONDANSETRON 2 MG/ML
4 INJECTION INTRAMUSCULAR; INTRAVENOUS EVERY 6 HOURS PRN
Status: DISCONTINUED | OUTPATIENT
Start: 2024-08-09 | End: 2024-08-09 | Stop reason: HOSPADM

## 2024-08-09 RX ORDER — LIDOCAINE 40 MG/G
CREAM TOPICAL
Status: DISCONTINUED | OUTPATIENT
Start: 2024-08-09 | End: 2024-08-09 | Stop reason: HOSPADM

## 2024-08-09 RX ORDER — ONDANSETRON 4 MG/1
4 TABLET, ORALLY DISINTEGRATING ORAL EVERY 6 HOURS PRN
Status: DISCONTINUED | OUTPATIENT
Start: 2024-08-09 | End: 2024-08-09 | Stop reason: HOSPADM

## 2024-08-09 RX ORDER — PROCHLORPERAZINE MALEATE 5 MG
5 TABLET ORAL EVERY 6 HOURS PRN
Status: DISCONTINUED | OUTPATIENT
Start: 2024-08-09 | End: 2024-08-09 | Stop reason: HOSPADM

## 2024-08-09 RX ADMIN — MIDAZOLAM 1 MG: 1 INJECTION INTRAMUSCULAR; INTRAVENOUS at 07:48

## 2024-08-09 RX ADMIN — MIDAZOLAM 1 MG: 1 INJECTION INTRAMUSCULAR; INTRAVENOUS at 07:54

## 2024-08-09 RX ADMIN — FENTANYL CITRATE 50 MCG: 50 INJECTION, SOLUTION INTRAMUSCULAR; INTRAVENOUS at 07:54

## 2024-08-09 RX ADMIN — FENTANYL CITRATE 50 MCG: 50 INJECTION, SOLUTION INTRAMUSCULAR; INTRAVENOUS at 07:57

## 2024-08-09 RX ADMIN — FENTANYL CITRATE 50 MCG: 50 INJECTION, SOLUTION INTRAMUSCULAR; INTRAVENOUS at 07:48

## 2024-08-09 ASSESSMENT — ACTIVITIES OF DAILY LIVING (ADL)
ADLS_ACUITY_SCORE: 35
ADLS_ACUITY_SCORE: 35

## 2024-08-09 NOTE — H&P
Pre-Endoscopy History and Physical     Mckenna Rios MRN# [id number]   YOB: 1960 Age: 64 year old     Date of Procedure: 8/9/2024  Primary care provider: Alexis Zheng  Type of Endoscopy: Colonoscopy with possible biopsy, possible polypectomy  Reason for Procedure: screen  Type of Anesthesia Anticipated: Conscious Sedation    HPI:    Mckenna is a 64 year old female who will be undergoing the above procedure.      A history and physical has been performed. The patient's medications and allergies have been reviewed. The risks and benefits of the procedure and the sedation options and risks were discussed with the patient.  All questions were answered and informed consent was obtained.      She denies a personal or family history of anesthesia complications or bleeding disorders.     Patient Active Problem List   Diagnosis    CLL (chronic lymphocytic leukemia) (H)        Past Medical History:   Diagnosis Date    CLL (chronic lymphocytic leukemia) (H)     sees oncology Dr Arroyo         Past Surgical History:   Procedure Laterality Date    CATARACT IOL, RT/LT  2018    GYN SURGERY      HYSTERECTOMY  2005    ovaries and tubes removed - fibroids     KERATOTOMY ARCUATE WITH FEMTOSECOND LASER/IMAGING FOR ATIOL Left 2/13/2018    Procedure: KERATOTOMY ARCUATE WITH FEMTOSECOND LASER/IMAGING FOR ATIOL;  LEFT EYE FEMTOSECOND LASER CAPSULOTOMY; LENS FRAGMENTATION; ARCUATE INCISIONS;  Surgeon: Sonu Allen MD;  Location: [facility]    PHACOEMULSIFICATION CLEAR CORNEA WITH DELUXE INTRAOCULAR LENS IMPLANT Left 2/13/2018    Procedure: PHACOEMULSIFICATION CLEAR CORNEA WITH DELUXE INTRAOCULAR LENS IMPLANT;  LEFT EYE FEMTOSECOND LASER ASSISTED PHACOEMULSIFICATION CLEAR CORNEA WITH DELUXE MULTIFOCAL INTRAOCULAR LENS IMPLANT;  Surgeon: Sonu Allen MD;  Location: [facility]       Social History     Tobacco Use    Smoking status: Never     Passive exposure: Never    Smokeless tobacco: Never   Substance Use  "Topics    Alcohol use: No       Family History   Problem Relation Age of Onset    Diabetes Father     Lung Cancer Father        Prior to Admission medications    Medication Sig Start Date End Date Taking? Authorizing Provider   multivitamin w/minerals (THERA-VIT-M) tablet Take 1 tablet by mouth daily 9/12/18   Traci Shepherd APRN CNP       No Known Allergies     REVIEW OF SYSTEMS:   5 point ROS negative except as noted above in HPI, including Gen., Resp., CV, GI &  system review.    PHYSICAL EXAM:   There were no vitals taken for this visit. Estimated body mass index is 22.28 kg/m  as calculated from the following:    Height as of 5/31/24: 1.6 m (5' 3\").    Weight as of 5/31/24: 57.1 kg (125 lb 12.8 oz).   GENERAL APPEARANCE: alert, and oriented  MENTAL STATUS: alert  AIRWAY EXAM: Mallampatti Class I (visualization of the soft palate, fauces, uvula, anterior and posterior pillars)  RESP: lungs clear to auscultation - no rales, rhonchi or wheezes  CV: regular rates and rhythm  DIAGNOSTICS:    Not indicated    IMPRESSION   ASA Class 2 - Mild systemic disease    PLAN:   Plan for Colonoscopy with possible biopsy, possible polypectomy. We discussed the risks, benefits and alternatives and the patient wished to proceed.    The above has been forwarded to the consulting provider.      Signed Electronically by: Clifford Booker MD  August 9, 2024          "

## 2024-08-16 ENCOUNTER — TRANSFERRED RECORDS (OUTPATIENT)
Dept: HEALTH INFORMATION MANAGEMENT | Facility: CLINIC | Age: 64
End: 2024-08-16
Payer: COMMERCIAL

## 2024-09-09 ENCOUNTER — TRANSFERRED RECORDS (OUTPATIENT)
Dept: HEALTH INFORMATION MANAGEMENT | Facility: CLINIC | Age: 64
End: 2024-09-09
Payer: COMMERCIAL

## 2024-10-31 ENCOUNTER — HOSPITAL ENCOUNTER (OUTPATIENT)
Dept: MAMMOGRAPHY | Facility: CLINIC | Age: 64
Discharge: HOME | End: 2024-10-31
Attending: PHYSICIAN ASSISTANT | Admitting: PHYSICIAN ASSISTANT
Payer: COMMERCIAL

## 2024-10-31 ENCOUNTER — ANCILLARY PROCEDURE (OUTPATIENT)
Dept: BONE DENSITY | Facility: CLINIC | Age: 64
End: 2024-10-31
Attending: PHYSICIAN ASSISTANT
Payer: COMMERCIAL

## 2024-10-31 DIAGNOSIS — Z00.00 ROUTINE GENERAL MEDICAL EXAMINATION AT A HEALTH CARE FACILITY: ICD-10-CM

## 2024-10-31 PROCEDURE — 77063 BREAST TOMOSYNTHESIS BI: CPT

## 2024-10-31 PROCEDURE — 77080 DXA BONE DENSITY AXIAL: CPT | Mod: TC | Performed by: PHYSICIAN ASSISTANT

## 2024-11-26 ENCOUNTER — OFFICE VISIT (OUTPATIENT)
Dept: FAMILY MEDICINE | Facility: CLINIC | Age: 64
End: 2024-11-26
Payer: COMMERCIAL

## 2024-11-26 VITALS
SYSTOLIC BLOOD PRESSURE: 110 MMHG | DIASTOLIC BLOOD PRESSURE: 63 MMHG | TEMPERATURE: 97.8 F | BODY MASS INDEX: 21.32 KG/M2 | HEIGHT: 63 IN | WEIGHT: 120.3 LBS | HEART RATE: 80 BPM | OXYGEN SATURATION: 98 % | RESPIRATION RATE: 26 BRPM

## 2024-11-26 DIAGNOSIS — M81.0 AGE-RELATED OSTEOPOROSIS WITHOUT CURRENT PATHOLOGICAL FRACTURE: Primary | ICD-10-CM

## 2024-11-26 PROCEDURE — 83970 ASSAY OF PARATHORMONE: CPT | Performed by: PHYSICIAN ASSISTANT

## 2024-11-26 PROCEDURE — 36415 COLL VENOUS BLD VENIPUNCTURE: CPT | Performed by: PHYSICIAN ASSISTANT

## 2024-11-26 PROCEDURE — 82306 VITAMIN D 25 HYDROXY: CPT | Performed by: PHYSICIAN ASSISTANT

## 2024-11-26 PROCEDURE — 99213 OFFICE O/P EST LOW 20 MIN: CPT | Performed by: PHYSICIAN ASSISTANT

## 2024-11-26 PROCEDURE — 80048 BASIC METABOLIC PNL TOTAL CA: CPT | Performed by: PHYSICIAN ASSISTANT

## 2024-11-26 PROCEDURE — 84443 ASSAY THYROID STIM HORMONE: CPT | Performed by: PHYSICIAN ASSISTANT

## 2024-11-26 ASSESSMENT — PAIN SCALES - GENERAL: PAINLEVEL_OUTOF10: NO PAIN (0)

## 2024-11-26 NOTE — PATIENT INSTRUCTIONS
Calcium 1200 -- Vitamin D 800    The medication to consider would be alendronate--fosamax (bisphosphonate category)

## 2024-11-26 NOTE — PROGRESS NOTES
"  Assessment & Plan     Age-related osteoporosis without current pathological fracture  Discussed bisphosphonates, D and Ca supplementation, strength activities and work up. She has hx of CLL but never received treatment and I dont think this is playing role. She can also ask oncology team. She'll think about fosamax, work to incorporate more strength activities and ensure excellent supplementation of Vit D and Ca.   - Vitamin D Deficiency; Future  - Basic metabolic panel  (Ca, Cl, CO2, Creat, Gluc, K, Na, BUN); Future  - Parathyroid Hormone Intact; Future  - TSH with free T4 reflex; Future  - Vitamin D Deficiency  - Basic metabolic panel  (Ca, Cl, CO2, Creat, Gluc, K, Na, BUN)  - Parathyroid Hormone Intact  - TSH with free T4 reflex        Oscar Andres is a 64 year old, presenting for the following health issues:  RECHECK        11/26/2024     3:38 PM   Additional Questions   Roomed by zev FAUSTIN         11/26/2024     3:38 PM   Patient Reported Additional Medications   Patient reports taking the following new medications none     History of Present Illness       Reason for visit:  Osteoporosis   She is taking medications regularly.       Here to discuss recent DEXA  She was surprised by the results  No other concerns today  Walks regularly  Plenty of calcium in the diet  No hx of multiple fx  Hysterectomy including ovaries in late 40s  One pregnancy        Review of Systems  Constitutional, HEENT, cardiovascular, pulmonary, gi and gu systems are negative, except as otherwise noted.      Objective    /63 (BP Location: Right arm, Patient Position: Sitting, Cuff Size: Adult Regular)   Pulse 80   Temp 97.8  F (36.6  C) (Oral)   Resp 26   Ht 1.6 m (5' 2.99\")   Wt 54.6 kg (120 lb 4.8 oz)   SpO2 98%   BMI 21.32 kg/m    Body mass index is 21.32 kg/m .  Physical Exam   GENERAL: alert and no distress  PSYCH: mentation appears normal, affect normal/bright    Diagnostics: DEXA        Signed Electronically by: " Alexis Zheng PA-C

## 2024-11-27 LAB
ANION GAP SERPL CALCULATED.3IONS-SCNC: 9 MMOL/L (ref 7–15)
BUN SERPL-MCNC: 23.5 MG/DL (ref 8–23)
CALCIUM SERPL-MCNC: 9.5 MG/DL (ref 8.8–10.4)
CHLORIDE SERPL-SCNC: 101 MMOL/L (ref 98–107)
CREAT SERPL-MCNC: 0.75 MG/DL (ref 0.51–0.95)
EGFRCR SERPLBLD CKD-EPI 2021: 87 ML/MIN/1.73M2
GLUCOSE SERPL-MCNC: 93 MG/DL (ref 70–99)
HCO3 SERPL-SCNC: 28 MMOL/L (ref 22–29)
POTASSIUM SERPL-SCNC: 4.3 MMOL/L (ref 3.4–5.3)
PTH-INTACT SERPL-MCNC: 33 PG/ML (ref 15–65)
SODIUM SERPL-SCNC: 138 MMOL/L (ref 135–145)
TSH SERPL DL<=0.005 MIU/L-ACNC: 1.68 UIU/ML (ref 0.3–4.2)
VIT D+METAB SERPL-MCNC: 81 NG/ML (ref 20–50)

## 2025-03-31 ENCOUNTER — TRANSFERRED RECORDS (OUTPATIENT)
Dept: HEALTH INFORMATION MANAGEMENT | Facility: CLINIC | Age: 65
End: 2025-03-31
Payer: COMMERCIAL

## 2025-04-20 SDOH — HEALTH STABILITY: PHYSICAL HEALTH: ON AVERAGE, HOW MANY MINUTES DO YOU ENGAGE IN EXERCISE AT THIS LEVEL?: 50 MIN

## 2025-04-20 SDOH — HEALTH STABILITY: PHYSICAL HEALTH: ON AVERAGE, HOW MANY DAYS PER WEEK DO YOU ENGAGE IN MODERATE TO STRENUOUS EXERCISE (LIKE A BRISK WALK)?: 2 DAYS

## 2025-04-20 ASSESSMENT — SOCIAL DETERMINANTS OF HEALTH (SDOH): HOW OFTEN DO YOU GET TOGETHER WITH FRIENDS OR RELATIVES?: ONCE A WEEK

## 2025-04-21 ENCOUNTER — OFFICE VISIT (OUTPATIENT)
Dept: FAMILY MEDICINE | Facility: CLINIC | Age: 65
End: 2025-04-21
Payer: COMMERCIAL

## 2025-04-21 VITALS
TEMPERATURE: 98.4 F | DIASTOLIC BLOOD PRESSURE: 65 MMHG | WEIGHT: 118.2 LBS | HEIGHT: 63 IN | RESPIRATION RATE: 18 BRPM | BODY MASS INDEX: 20.94 KG/M2 | OXYGEN SATURATION: 100 % | HEART RATE: 87 BPM | SYSTOLIC BLOOD PRESSURE: 114 MMHG

## 2025-04-21 DIAGNOSIS — Z00.00 ROUTINE GENERAL MEDICAL EXAMINATION AT A HEALTH CARE FACILITY: Primary | ICD-10-CM

## 2025-04-21 DIAGNOSIS — C91.10 CLL (CHRONIC LYMPHOCYTIC LEUKEMIA) (H): ICD-10-CM

## 2025-04-21 DIAGNOSIS — M81.0 AGE-RELATED OSTEOPOROSIS WITHOUT CURRENT PATHOLOGICAL FRACTURE: ICD-10-CM

## 2025-04-21 LAB
CHOLEST SERPL-MCNC: 238 MG/DL
FASTING STATUS PATIENT QL REPORTED: YES
FASTING STATUS PATIENT QL REPORTED: YES
GLUCOSE SERPL-MCNC: 103 MG/DL (ref 70–99)
HDLC SERPL-MCNC: 82 MG/DL
LDLC SERPL CALC-MCNC: 144 MG/DL
NONHDLC SERPL-MCNC: 156 MG/DL
TRIGL SERPL-MCNC: 59 MG/DL
VIT D+METAB SERPL-MCNC: 45 NG/ML (ref 20–50)

## 2025-04-21 PROCEDURE — 3074F SYST BP LT 130 MM HG: CPT | Performed by: PHYSICIAN ASSISTANT

## 2025-04-21 PROCEDURE — 82947 ASSAY GLUCOSE BLOOD QUANT: CPT | Performed by: PHYSICIAN ASSISTANT

## 2025-04-21 PROCEDURE — 80061 LIPID PANEL: CPT | Performed by: PHYSICIAN ASSISTANT

## 2025-04-21 PROCEDURE — 82306 VITAMIN D 25 HYDROXY: CPT | Performed by: PHYSICIAN ASSISTANT

## 2025-04-21 PROCEDURE — 1126F AMNT PAIN NOTED NONE PRSNT: CPT | Performed by: PHYSICIAN ASSISTANT

## 2025-04-21 PROCEDURE — 3078F DIAST BP <80 MM HG: CPT | Performed by: PHYSICIAN ASSISTANT

## 2025-04-21 PROCEDURE — 36415 COLL VENOUS BLD VENIPUNCTURE: CPT | Performed by: PHYSICIAN ASSISTANT

## 2025-04-21 PROCEDURE — 99397 PER PM REEVAL EST PAT 65+ YR: CPT | Performed by: PHYSICIAN ASSISTANT

## 2025-04-21 ASSESSMENT — PAIN SCALES - GENERAL: PAINLEVEL_OUTOF10: NO PAIN (0)

## 2025-04-21 NOTE — PROGRESS NOTES
"Preventive Care Visit  [facility] ROXI Zheng PA-C, Family Medicine  Apr 21, 2025      Assessment & Plan     Routine general medical examination at a health care facility  Reviewed personal and family history. Reviewed age appropriate screenings. Recommended any needed vaccinations. Has work form that we'll fill out. She is moving to [place] and I've provided her with a list of her medical \"to do's\" to review when she establishes care.   - Lipid panel reflex to direct LDL Fasting; Future  - Glucose; Future  - Lipid panel reflex to direct LDL Fasting  - Glucose    CLL (chronic lymphocytic leukemia) (H)  Continues with hematology.     Age-related osteoporosis without current pathological fracture  We discussed this at her appt in November and at this time she is focused on simple otc treatments and routine exercise.   - Vitamin D Deficiency; Future  - Vitamin D Deficiency    The longitudinal plan of care for the diagnosis(es)/condition(s) as documented were addressed during this visit. Due to the added complexity in care, I will continue to support Mckenna in the subsequent management and with ongoing continuity of care.       Counseling  Appropriate preventive services were addressed with this patient via screening, questionnaire, or discussion as appropriate for fall prevention, nutrition, physical activity, Tobacco-use cessation, social engagement, weight loss and cognition.  Checklist reviewing preventive services available has been given to the patient.  Reviewed patient's diet, addressing concerns and/or questions.   She is at risk for lack of exercise and has been provided with information to increase physical activity for the benefit of her well-being.         Subjective   Mckenna is a 65 year old, presenting for the following:  Medicare Visit        4/21/2025     8:12 AM   Additional Questions   Roomed by Steffi LÓPEZ CMA   Accompanied by BEENA         4/21/2025   Forms   Any forms needing " to be completed Yes         4/21/2025     8:12 AM   Patient Reported Additional Medications   Patient reports taking the following new medications None          HPI         Patient states that they are moving to [place] in a couple of weeks.       Advance Care Planning    Advance care planning document is on file but is outdated.  Patient encouraged to update or provider to update POLST.        4/20/2025   General Health   How would you rate your overall physical health? Excellent   Feel stress (tense, anxious, or unable to sleep) Not at all         4/20/2025   Nutrition   Diet: Regular (no restrictions)         4/20/2025   Exercise   Days per week of moderate/strenous exercise 2 days   Average minutes spent exercising at this level 50 min   (!) EXERCISE CONCERN      4/20/2025   Social Factors   Frequency of gathering with friends or relatives Once a week   Worry food won't last until get money to buy more No   Food not last or not have enough money for food? No   Do you have housing? (Housing is defined as stable permanent housing and does not include staying ouside in a car, in a tent, in an abandoned building, in an overnight shelter, or couch-surfing.) Yes   Are you worried about losing your housing? No   Lack of transportation? No   Unable to get utilities (heat,electricity)? No         4/20/2025   Fall Risk   Fallen 2 or more times in the past year? No   Trouble with walking or balance? No          4/20/2025   Activities of Daily Living- Home Safety   Needs help with the following daily activites None of the above   Safety concerns in the home None of the above         4/20/2025   Dental   Dentist two times every year? Yes         4/20/2025   Hearing Screening   Hearing concerns? None of the above         4/20/2025   Driving Risk Screening   Patient/family members have concerns about driving No         4/20/2025   General Alertness/Fatigue Screening   Have you been more tired than usual lately? No          4/20/2025   Urinary Incontinence Screening   Bothered by leaking urine in past 6 months No         Today's PHQ-2 Score:       4/20/2025     4:27 AM   PHQ-2 ( 1999 Pf[employer])   Q1: Little interest or pleasure in doing things 0   Q2: Feeling down, depressed or hopeless 0   PHQ-2 Score 0    Q1: Little interest or pleasure in doing things Not at all   Q2: Feeling down, depressed or hopeless Not at all   PHQ-2 Score 0       Patient-reported           4/20/2025   Substance Use   Alcohol more than 3/day or more than 7/wk No   Do you have a current opioid prescription? No   How severe/bad is pain from 1 to 10? 0/10 (No Pain)   Do you use any other substances recreationally? No     Social History     Tobacco Use    Smoking status: Never     Passive exposure: Never    Smokeless tobacco: Never   Vaping Use    Vaping status: Never Used   Substance Use Topics    Alcohol use: No    Drug use: No           10/31/2024   LAST FHS-7 RESULTS   1st degree relative breast or ovarian cancer No   Any relative bilateral breast cancer No   Any male have breast cancer No   Any ONE woman have BOTH breast AND ovarian cancer No   Any woman with breast cancer before 50yrs Yes    Yes   2 or more relatives with breast AND/OR ovarian cancer No   2 or more relatives with breast AND/OR bowel cancer No       Multiple values from one day are sorted in reverse-chronological order        Mammogram Screening - Mammogram every 1-2 years updated in Health Maintenance based on mutual decision making      History of abnormal Pap smear: No - age 65 or older with adequate negative prior screening test results (3 consecutive negative cytology results, 2 consecutive negative cotesting results, or 2 consecutive negative HrHPV test results within 10 years, with the most recent test occurring within the recommended screening interval for the test used)        Latest Ref Rng & Units 9/12/2018     9:25 AM 9/12/2018     9:17 AM 11/1/2015    12:00 AM   PAP / HPV   PAP  (Historical)   NIL  Negative       HPV 16 DNA NEG^Negative Negative      HPV 18 DNA NEG^Negative Negative      Other HR HPV NEG^Negative Negative          This result is from an external source.     ASCVD Risk   The 10-year ASCVD risk score (Ronda MEJIA, et al., 2019) is: 5.3%    Values used to calculate the score:      Age: 65 years      Sex: Female      Is Non- : No      Diabetic: No      Tobacco smoker: No      Systolic Blood Pressure: 114 mmHg      Is BP treated: No      HDL Cholesterol: 82 mg/dL      Total Cholesterol: 244 mg/dL            Reviewed and updated as needed this visit by Provider                    Lab work is in process  Labs reviewed in EPIC  Current providers sharing in care for this patient include:  Patient Care Team:  Alexis Zheng PA-C as PCP - General (Family Medicine)  Alexis Zheng PA-C as Assigned PCP    The following health maintenance items are reviewed in Epic and correct as of today:  Health Maintenance   Topic Date Due    Pneumococcal Vaccine: 50+ Years (1 of 2 - PCV) Never done    ZOSTER IMMUNIZATION (1 of 2) Never done    RSV VACCINE (1 - Risk 60-74 years 1-dose series) Never done    COVID-19 Vaccine (3 - Moderna risk series) 06/12/2021    INFLUENZA VACCINE (1) 09/01/2024    ANNUAL REVIEW OF HM ORDERS  05/31/2025    MEDICARE ANNUAL WELLNESS VISIT  05/31/2025    DTAP/TDAP/TD IMMUNIZATION (2 - Td or Tdap) 09/12/2025    FALL RISK ASSESSMENT  04/21/2026    MAMMO SCREENING  10/31/2026    DIABETES SCREENING  11/26/2027    LIPID  05/31/2029    ADVANCE CARE PLANNING  05/31/2029    COLORECTAL CANCER SCREENING  08/09/2031    DEXA  10/31/2039    HEPATITIS C SCREENING  Completed    PHQ-2 (once per calendar year)  Completed    HPV IMMUNIZATION  Aged Out    MENINGITIS IMMUNIZATION  Aged Out    PAP  Discontinued         Review of Systems  Constitutional, HEENT, cardiovascular, pulmonary, gi and gu systems are negative, except as otherwise noted.     Objective   "  Exam  /65 (BP Location: Right arm, Patient Position: Sitting, Cuff Size: Adult Regular)   Pulse 87   Temp 98.4  F (36.9  C) (Oral)   Resp 18   Ht 1.6 m (5' 3\")   Wt 53.6 kg (118 lb 3.2 oz)   SpO2 100%   BMI 20.94 kg/m     Estimated body mass index is 20.94 kg/m  as calculated from the following:    Height as of this encounter: 1.6 m (5' 3\").    Weight as of this encounter: 53.6 kg (118 lb 3.2 oz).    Physical Exam  GENERAL: alert and no distress  EYES: Eyes grossly normal to inspection, PERRL and conjunctivae and sclerae normal  HENT: ear canals and TM's normal, nose and mouth without ulcers or lesions  NECK: no adenopathy, no asymmetry, masses, or scars  RESP: lungs clear to auscultation - no rales, rhonchi or wheezes  CV: regular rate and rhythm, normal S1 S2, no S3 or S4, no murmur, click or rub, no peripheral edema  ABDOMEN: soft, nontender, no hepatosplenomegaly, no masses and bowel sounds normal  MS: no gross musculoskeletal defects noted, no edema  SKIN: no suspicious lesions or rashes  PSYCH: mentation appears normal, affect normal/bright        4/21/2025   Mini Cog   Clock Draw Score 2 Normal   3 Item Recall 3 objects recalled   Mini Cog Total Score 5             Signed Electronically by: Alexis Zheng PA-C    "

## 2025-04-21 NOTE — PATIENT INSTRUCTIONS
Generally shoot for 800 D/1200 Calcium daily but I know you're also getting some in the diet too which is great.    Establish with oncology/hematology  Establish with Primary care  Last mammo 10/2024  Last colonoscopy 08/2024 (repeat 7 years)  Last Dexa 10/2024 (osteoporosis!) should repeat about 2 years  Last PAP in OUR records 09/2018 and normal (we dont have Dr. Jaime's records  - you should confirm with them)      Patient Education   Preventive Care Advice   This is general advice given by our system to help you stay healthy. However, your care team may have specific advice just for you. Please talk to your care team about your preventive care needs.  Nutrition  Eat 5 or more servings of fruits and vegetables each day.  Try wheat bread, brown rice and whole grain pasta (instead of white bread, rice, and pasta).  Get enough calcium and vitamin D. Check the label on foods and aim for 100% of the RDA (recommended daily allowance).  Lifestyle  Exercise at least 150 minutes each week  (30 minutes a day, 5 days a week).  Do muscle strengthening activities 2 days a week. These help control your weight and prevent disease.  No smoking.  Wear sunscreen to prevent skin cancer.  Have a dental exam and cleaning every 6 months.  Yearly exams  See your health care team every year to talk about:  Any changes in your health.  Any medicines your care team has prescribed.  Preventive care, family planning, and ways to prevent chronic diseases.  Shots (vaccines)   HPV shots (up to age 26), if you've never had them before.  Hepatitis B shots (up to age 59), if you've never had them before.  COVID-19 shot: Get this shot when it's due.  Flu shot: Get a flu shot every year.  Tetanus shot: Get a tetanus shot every 10 years.  Pneumococcal, hepatitis A, and RSV shots: Ask your care team if you need these based on your risk.  Shingles shot (for age 50 and up)  General health tests  Diabetes screening:  Starting at age 35, Get screened for  diabetes at least every 3 years.  If you are younger than age 35, ask your care team if you should be screened for diabetes.  Cholesterol test: At age 39, start having a cholesterol test every 5 years, or more often if advised.  Bone density scan (DEXA): At age 50, ask your care team if you should have this scan for osteoporosis (brittle bones).  Hepatitis C: Get tested at least once in your life.  STIs (sexually transmitted infections)  Before age 24: Ask your care team if you should be screened for STIs.  After age 24: Get screened for STIs if you're at risk. You are at risk for STIs (including HIV) if:  You are sexually active with more than one person.  You don't use condoms every time.  You or a partner was diagnosed with a sexually transmitted infection.  If you are at risk for HIV, ask about PrEP medicine to prevent HIV.  Get tested for HIV at least once in your life, whether you are at risk for HIV or not.  Cancer screening tests  Cervical cancer screening: If you have a cervix, begin getting regular cervical cancer screening tests starting at age 21.  Breast cancer scan (mammogram): If you've ever had breasts, begin having regular mammograms starting at age 40. This is a scan to check for breast cancer.  Colon cancer screening: It is important to start screening for colon cancer at age 45.  Have a colonoscopy test every 10 years (or more often if you're at risk) Or, ask your provider about stool tests like a FIT test every year or Cologuard test every 3 years.  To learn more about your testing options, visit:   .  For help making a decision, visit:   https://bit.ly/iq85560.  Prostate cancer screening test: If you have a prostate, ask your care team if a prostate cancer screening test (PSA) at age 55 is right for you.  Lung cancer screening: If you are a current or former smoker ages 50 to 80, ask your care team if ongoing lung cancer screenings are right for you.  For informational purposes only. Not to  replace the advice of your health care provider. Copyright   2023 [facility]. All rights reserved. Clinically reviewed by the [facility] Transitions Program. [employer] [id number] - REV 01/24.

## (undated) DEVICE — EYE PACK CUSTOM ANTERIOR 30DEG TIP CENTURION PPK6682-04

## (undated) DEVICE — LINEN TOWEL PACK X5 5464

## (undated) DEVICE — EYE KNIFE SLIT XSTAR VISITEC 2.5MM 45DEG DBL BEVEL 370825

## (undated) DEVICE — EYE SHIELD PLASTIC

## (undated) DEVICE — GLOVE PROTEXIS MICRO 8.5  2D73PM85

## (undated) DEVICE — KIT ENDO TURNOVER/PROCEDURE W/CLEAN A SCOPE LINERS 103888

## (undated) DEVICE — EYE TIP IRRIGATION & ASPIRATION POLYMER 35D BENT 8065751511

## (undated) DEVICE — TAPE MICROPORE 2"X1.5YD 1530S-2

## (undated) DEVICE — EYE SOL BSS 500ML

## (undated) DEVICE — PACK CATARACT CUSTOM SO DALE SEY32CTFCX

## (undated) DEVICE — GLOVE PROTEXIS MICRO 7.0  2D73PM70

## (undated) DEVICE — EYE PACK BVI READYPAK KIT #2

## (undated) RX ORDER — FENTANYL CITRATE 50 UG/ML
INJECTION, SOLUTION INTRAMUSCULAR; INTRAVENOUS
Status: DISPENSED
Start: 2024-08-09

## (undated) RX ORDER — LIDOCAINE HYDROCHLORIDE 10 MG/ML
INJECTION, SOLUTION EPIDURAL; INFILTRATION; INTRACAUDAL; PERINEURAL
Status: DISPENSED
Start: 2018-02-13

## (undated) RX ORDER — ONDANSETRON 2 MG/ML
INJECTION INTRAMUSCULAR; INTRAVENOUS
Status: DISPENSED
Start: 2018-02-13